# Patient Record
Sex: MALE | Race: WHITE | NOT HISPANIC OR LATINO | Employment: OTHER | URBAN - METROPOLITAN AREA
[De-identification: names, ages, dates, MRNs, and addresses within clinical notes are randomized per-mention and may not be internally consistent; named-entity substitution may affect disease eponyms.]

---

## 2017-02-15 ENCOUNTER — GENERIC CONVERSION - ENCOUNTER (OUTPATIENT)
Dept: OTHER | Facility: OTHER | Age: 82
End: 2017-02-15

## 2017-03-22 ENCOUNTER — ALLSCRIPTS OFFICE VISIT (OUTPATIENT)
Dept: OTHER | Facility: OTHER | Age: 82
End: 2017-03-22

## 2017-05-02 ENCOUNTER — GENERIC CONVERSION - ENCOUNTER (OUTPATIENT)
Dept: OTHER | Facility: OTHER | Age: 82
End: 2017-05-02

## 2017-05-10 ENCOUNTER — ALLSCRIPTS OFFICE VISIT (OUTPATIENT)
Dept: OTHER | Facility: OTHER | Age: 82
End: 2017-05-10

## 2017-06-28 ENCOUNTER — ALLSCRIPTS OFFICE VISIT (OUTPATIENT)
Dept: OTHER | Facility: OTHER | Age: 82
End: 2017-06-28

## 2017-07-11 ENCOUNTER — HOSPITAL ENCOUNTER (EMERGENCY)
Facility: HOSPITAL | Age: 82
Discharge: HOME | End: 2017-07-11
Attending: EMERGENCY MEDICINE | Admitting: EMERGENCY MEDICINE
Payer: MEDICARE

## 2017-07-11 ENCOUNTER — APPOINTMENT (EMERGENCY)
Dept: RADIOLOGY | Facility: HOSPITAL | Age: 82
End: 2017-07-11
Payer: MEDICARE

## 2017-07-11 ENCOUNTER — APPOINTMENT (EMERGENCY)
Dept: CT IMAGING | Facility: HOSPITAL | Age: 82
End: 2017-07-11
Payer: MEDICARE

## 2017-07-11 VITALS
OXYGEN SATURATION: 94 % | WEIGHT: 119 LBS | RESPIRATION RATE: 18 BRPM | DIASTOLIC BLOOD PRESSURE: 81 MMHG | HEART RATE: 67 BPM | TEMPERATURE: 97.9 F | SYSTOLIC BLOOD PRESSURE: 178 MMHG

## 2017-07-11 DIAGNOSIS — S22.42XA MULTIPLE FRACTURES OF RIBS, LEFT SIDE, INITIAL ENCOUNTER FOR CLOSED FRACTURE: ICD-10-CM

## 2017-07-11 DIAGNOSIS — T07.XXXA ABRASIONS OF MULTIPLE SITES: ICD-10-CM
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
## 2017-07-11 LAB
ANION GAP SERPL CALCULATED.3IONS-SCNC: 5 MMOL/L (ref 4–13)
BASOPHILS # BLD AUTO: 0.01 THOUSANDS/ΜL (ref 0–0.1)
BASOPHILS NFR BLD AUTO: 0 % (ref 0–1)
BUN SERPL-MCNC: 22 MG/DL (ref 5–25)
CALCIUM SERPL-MCNC: 9 MG/DL (ref 8.3–10.1)
CHLORIDE SERPL-SCNC: 103 MMOL/L (ref 100–108)
CO2 SERPL-SCNC: 34 MMOL/L (ref 21–32)
CREAT SERPL-MCNC: 1.01 MG/DL (ref 0.6–1.3)
EOSINOPHIL # BLD AUTO: 0.04 THOUSAND/ΜL (ref 0–0.61)
EOSINOPHIL NFR BLD AUTO: 1 % (ref 0–6)
ERYTHROCYTE [DISTWIDTH] IN BLOOD BY AUTOMATED COUNT: 15.5 % (ref 11.6–15.1)
GFR SERPL CREATININE-BSD FRML MDRD: >60 ML/MIN/1.73SQ M
GLUCOSE SERPL-MCNC: 147 MG/DL (ref 65–140)
HCT VFR BLD AUTO: 41.8 % (ref 36.5–49.3)
HGB BLD-MCNC: 13.1 G/DL (ref 12–17)
LYMPHOCYTES # BLD AUTO: 1.71 THOUSANDS/ΜL (ref 0.6–4.47)
LYMPHOCYTES NFR BLD AUTO: 24 % (ref 14–44)
MCH RBC QN AUTO: 29.6 PG (ref 26.8–34.3)
MCHC RBC AUTO-ENTMCNC: 31.3 G/DL (ref 31.4–37.4)
MCV RBC AUTO: 94 FL (ref 82–98)
MONOCYTES # BLD AUTO: 0.72 THOUSAND/ΜL (ref 0.17–1.22)
MONOCYTES NFR BLD AUTO: 10 % (ref 4–12)
NEUTROPHILS # BLD AUTO: 4.79 THOUSANDS/ΜL (ref 1.85–7.62)
NEUTS SEG NFR BLD AUTO: 65 % (ref 43–75)
NRBC BLD AUTO-RTO: 0 /100 WBCS
PLATELET # BLD AUTO: 207 THOUSANDS/UL (ref 149–390)
PMV BLD AUTO: 9.1 FL (ref 8.9–12.7)
POTASSIUM SERPL-SCNC: 3.7 MMOL/L (ref 3.5–5.3)
RBC # BLD AUTO: 4.43 MILLION/UL (ref 3.88–5.62)
SODIUM SERPL-SCNC: 142 MMOL/L (ref 136–145)
WBC # BLD AUTO: 7.27 THOUSAND/UL (ref 4.31–10.16)

## 2017-07-11 PROCEDURE — 90715 TDAP VACCINE 7 YRS/> IM: CPT | Performed by: EMERGENCY MEDICINE

## 2017-07-11 PROCEDURE — 96360 HYDRATION IV INFUSION INIT: CPT

## 2017-07-11 PROCEDURE — 80048 BASIC METABOLIC PNL TOTAL CA: CPT | Performed by: EMERGENCY MEDICINE

## 2017-07-11 PROCEDURE — 70450 CT HEAD/BRAIN W/O DYE: CPT

## 2017-07-11 PROCEDURE — 85025 COMPLETE CBC W/AUTO DIFF WBC: CPT | Performed by: EMERGENCY MEDICINE

## 2017-07-11 PROCEDURE — 71101 X-RAY EXAM UNILAT RIBS/CHEST: CPT

## 2017-07-11 PROCEDURE — 71250 CT THORAX DX C-: CPT

## 2017-07-11 PROCEDURE — 99284 EMERGENCY DEPT VISIT MOD MDM: CPT

## 2017-07-11 PROCEDURE — 36415 COLL VENOUS BLD VENIPUNCTURE: CPT | Performed by: EMERGENCY MEDICINE

## 2017-07-11 PROCEDURE — 90471 IMMUNIZATION ADMIN: CPT

## 2017-07-11 RX ORDER — LACTOSE-REDUCED FOOD
1 LIQUID (ML) ORAL 2 TIMES DAILY
COMMUNITY

## 2017-07-11 RX ORDER — BACITRACIN, NEOMYCIN, POLYMYXIN B 400; 3.5; 5 [USP'U]/G; MG/G; [USP'U]/G
1 OINTMENT TOPICAL ONCE
Status: COMPLETED | OUTPATIENT
Start: 2017-07-11 | End: 2017-07-11

## 2017-07-11 RX ORDER — LATANOPROST 50 UG/ML
1 SOLUTION/ DROPS OPHTHALMIC DAILY
COMMUNITY

## 2017-07-11 RX ORDER — NITROGLYCERIN 0.4 MG/1
0.4 TABLET SUBLINGUAL
COMMUNITY
End: 2018-08-02

## 2017-07-11 RX ORDER — LISINOPRIL 20 MG/1
20 TABLET ORAL 2 TIMES DAILY
COMMUNITY
End: 2018-08-10 | Stop reason: HOSPADM

## 2017-07-11 RX ORDER — GLIMEPIRIDE 2 MG/1
1 TABLET ORAL DAILY
COMMUNITY

## 2017-07-11 RX ORDER — OMEPRAZOLE 10 MG/1
20 CAPSULE, DELAYED RELEASE ORAL DAILY
COMMUNITY

## 2017-07-11 RX ORDER — SIMVASTATIN 80 MG
40 TABLET ORAL
COMMUNITY

## 2017-07-11 RX ORDER — LISINOPRIL 5 MG/1
10 TABLET ORAL ONCE
Status: COMPLETED | OUTPATIENT
Start: 2017-07-11 | End: 2017-07-11

## 2017-07-11 RX ORDER — ASPIRIN 325 MG
81 TABLET ORAL DAILY
COMMUNITY

## 2017-07-11 RX ORDER — FINASTERIDE 5 MG/1
5 TABLET, FILM COATED ORAL DAILY
COMMUNITY

## 2017-07-11 RX ADMIN — BACITRACIN ZINC, NEOMYCIN, POLYMYXIN B 1 LARGE APPLICATION: 400; 3.5; 5 OINTMENT TOPICAL at 16:15

## 2017-07-11 RX ADMIN — SODIUM CHLORIDE 500 ML: 0.9 INJECTION, SOLUTION INTRAVENOUS at 15:26

## 2017-07-11 RX ADMIN — LISINOPRIL 10 MG: 5 TABLET ORAL at 17:50

## 2017-07-11 RX ADMIN — TETANUS TOXOID, REDUCED DIPHTHERIA TOXOID AND ACELLULAR PERTUSSIS VACCINE, ADSORBED 0.5 ML: 5; 2.5; 8; 8; 2.5 SUSPENSION INTRAMUSCULAR at 16:13

## 2017-09-13 ENCOUNTER — ALLSCRIPTS OFFICE VISIT (OUTPATIENT)
Dept: OTHER | Facility: OTHER | Age: 82
End: 2017-09-13

## 2018-01-03 ENCOUNTER — GENERIC CONVERSION - ENCOUNTER (OUTPATIENT)
Dept: OTHER | Facility: OTHER | Age: 83
End: 2018-01-03

## 2018-01-10 NOTE — PROGRESS NOTES
Assessment  Assessed    1  Benign essential hypertension (401 1) (I10)   2  Arteriosclerosis of coronary artery (414 00) (I25 10)   3  Sinus bradycardia (427 89) (R00 1)   4  Hyperlipidemia (272 4) (E78 5)    Plan  Hyperlipidemia    · Follow-up visit in 6 months Evaluation and Treatment  Follow-up  Status: Hold For -  Scheduling  Requested for: 20Jan2016   Ordered; For: Hyperlipidemia; Ordered By: Lucia Burns Performed:  Due: [id number]    Discussion/Summary  Cardiology Discussion Summary Free Text Note Form [address]:   Cardiovascular status stable  Requested be checked on every 2 months which I do not feel his unreasonable in the setting of his known coronary disease bradycardia and hypertension we will set up that appointment  He will call if he develops any cardiovascular issues  Chief Complaint  Chief Complaint Chronic Condition [facility]: Patient is here today for follow up of chronic conditions described in HPI  History of Present Illness  Cardiology HPI Free Text Note Form [facility]: Continues to struggle emotionally with the loss of his son  Feels weak but gets around okay  Recently celebrated 80 birthday  Occasional orthostasis than his stable  Denies chest pain shortness of breath orthopnea paroxysmal nocturnal dyspnea or syncope      Review of Systems  Cardiology Male ROS:     Cardiac: no chest pain, no rhythm problems, no fainting/blackouts, no signs of swelling and no palpitations present  Skin: No complaints of nonhealing sores or skin rash  Genitourinary: frequent urination at night, but no recurrent urinary tract infections, no difficult urination, no blood in urine, no kidney stones, no loss of bladder control, no kidney problems, no prostate problems and no erectile dysfunction   Psychological: no depression and no difficulty concentrating  General: no trouble sleeping, no changes in weight, no lack of energy/fatigue, no night sweats and no frequent infections     Respiratory: No complaints of shortness of breath, cough with sputum, or wheezing  HEENT: No complaints of serious problems, hearing problems, nose problems, throat problems, or snoring  Gastrointestinal: diarrhea and abdonimal pain, but no nausea, no vomiting and no bloody stools stomach upset last night  Neurological: No complaints of numbness, tingling, dizziness, weakness, seizures, headaches, syncope or fainting, AM fatigue, daytime sleepiness, no witnessed apnea episodes  Musculoskeletal: No complaints of arthritis, back pain, or painfull swelling  ROS Reviewed:   ROS reviewed  Active Problems  Problems    1  Abdominal mass, LLQ (left lower quadrant) (789 34) (R19 04)   2  Angina pectoris (413 9) (I20 9)   3  Arteriosclerosis of coronary artery (414 00) (I25 10)   4  Benign essential hypertension (401 1) (I10)   5  Esophageal reflux (530 81) (K21 9)   6  Hyperlipidemia (272 4) (E78 5)   7  Malaise and fatigue (780 79) (R53 81,R53 83)   8  Sinus bradycardia (427 89) (R00 1)    Past Medical History  Problems    1  History of Acute myocardial infarction of inferior wall (410 40) (I21 19)   2  History of falling (V15 88) (Z91 81)   3  History of vertigo (V12 49) (Z87 898)   4  History of Staggering gait (781 2) (R26 0)  Active Problems And Past Medical History Reviewed: The active problems and past medical history were reviewed and updated today  Surgical History  Problems    1  History of Cholecystectomy   2  History of Hernia Repair  Surgical History Reviewed: The surgical history was reviewed and updated today  Family History  Family History Reviewed: The family history was reviewed and updated today  Social History  Problems    · Being A Social Drinker   · Never A Smoker  Social History Reviewed: The social history was reviewed and updated today  The social history was reviewed and is unchanged  Current Meds   1  Aspirin 325 MG Oral Tablet; Take 1 tablet daily Recorded   2   [employer] Iron TABS; Take 1 tablet daily as directed Recorded   3  Finasteride 5 MG Oral Tablet; TAKE 1 TABLET DAILY; Therapy: 12BLV3544 to Recorded   4  Latanoprost 0 005 % Ophthalmic Solution; USE AS DIRECTED Recorded   5  Lisinopril 20 MG Oral Tablet; One po BID; Therapy: 57QHU0702 to (Evaluate:21Jan2015)  Requested for: 44Fqu7767; Last   Rx:[id number]; Status: ACTIVE - Retrospective Authorization Ordered   6  Nitrostat 0 4 MG Sublingual Tablet Sublingual; DISSOLVE 1 TABLET UNDER THE   TONGUE AS NEEDED FOR CHEST PAIN Recorded   7  Protonix 40 MG Oral Tablet Delayed Release; Take 1 tablet daily Recorded   8  Senokot TABS; Take as directed Recorded   9  Simvastatin 80 MG Oral Tablet; TAKE 0 5 TABLET Daily at bedtime Recorded   10  Timolol Maleate 0 25 % Ophthalmic Solution; Therapy: 21Apr2011 to (Last Rx:21Apr2011)  Requested for: 21Apr2011 Ordered  Medication List Reviewed: The medication list was reviewed and updated today  Allergies  Medication    1  No Known Drug Allergies    Vitals  Vital Signs [Data Includes: Current Encounter]    Recorded: 01IUL8264 12:06PM   Heart Rate 64   Respiration 16   Systolic 714   Diastolic 60   Height 5 ft 5 in   Weight 118 lb    BMI Calculated 19 64   BSA Calculated 1 58     Physical Exam    Constitutional   General appearance: No acute distress, well appearing and well nourished  Eyes   Conjunctiva and Sclera examination: Conjunctiva pink, sclera anicteric  Ears, Nose, Mouth, and Throat - Oropharynx: Clear, nares are clear, mucous membranes are moist    Neck   Neck and thyroid: Normal, supple, trachea midline, no thyromegaly  Cardiovascular   Auscultation of heart: Normal rate and rhythm, normal S1 and S2, no murmurs  Pedal pulses: Abnormal   Posterior tibialis pulse was 0 on the right and 0 on the left  Abdomen   Abdomen: Non-tender and no distention  Musculoskeletal Gait and station: Normal gait     Skin - Skin and subcutaneous tissue: Normal without rashes or lesions  Skin is warm and well perfused, normal turgor      Neurologic - Speech: Normal     Psychiatric - Orientation to person, place, and time: Normal  Mood and affect: Normal       Signatures   Electronically signed by : LISA Pate ; Jan 20 2016 12:47PM EST                       (Author)

## 2018-01-10 NOTE — PROCEDURES
Procedures signed  by Aretha Ogden DO at 3/25/2016  9:46 AM       Author:  Aretha Ogden DO Service:  Cardiology Author Type:  Physician     Filed:  3/25/2016  9:46 AM Date of Service:  3/23/2016 11:22 AM Status:  Signed     :  Aretha Ogden DO (Physician)              Lloyd Ibrahim  [id number]  HOLTER MONITOR  03/17/2016    ORDERING PHYSICIAN  Dr Callie Millan    INDICATION  Angina and coronary artery disease  48 Hour Holter    Holter monitor duration 47 hours 59 minutes  Underlying rhythm is   normal sinus rhythm  Average heart rate 57 beats per minute ranging   from 50 to 97 beats per minute  There were frequent premature ventricular contractions consisting of   single beats  There were frequent premature atrial contractions with 8   runs of supraventricular tachycardia, the longest run consisting of 4   beats with the fastest at 133 beats per minute  There was no   significant bradycardia  There were no ST segment changes  The patient activity diary was included both day 1 and day 2 which was   returned without symptoms  No prior was available for comparison          [id number]  D:  03/23/2016 08:00:51  Dictated by:  Mikhail Goncalves DO  T:  03/23/2016 11:22:42    CC:  Sammie Mcneil MD           Received for:Herbie Rasmussen DO  Mar 25 2016  9:47AM [facility] Standard Time

## 2018-01-13 VITALS
BODY MASS INDEX: 19.68 KG/M2 | SYSTOLIC BLOOD PRESSURE: 102 MMHG | RESPIRATION RATE: 16 BRPM | DIASTOLIC BLOOD PRESSURE: 82 MMHG | HEART RATE: 52 BPM | WEIGHT: 118.13 LBS | HEIGHT: 65 IN

## 2018-01-13 VITALS
DIASTOLIC BLOOD PRESSURE: 64 MMHG | WEIGHT: 113 LBS | RESPIRATION RATE: 16 BRPM | BODY MASS INDEX: 18.83 KG/M2 | HEIGHT: 65 IN | HEART RATE: 60 BPM | SYSTOLIC BLOOD PRESSURE: 124 MMHG

## 2018-01-13 VITALS
HEIGHT: 65 IN | HEART RATE: 68 BPM | BODY MASS INDEX: 19.49 KG/M2 | WEIGHT: 117 LBS | SYSTOLIC BLOOD PRESSURE: 120 MMHG | DIASTOLIC BLOOD PRESSURE: 60 MMHG

## 2018-01-14 VITALS
DIASTOLIC BLOOD PRESSURE: 60 MMHG | SYSTOLIC BLOOD PRESSURE: 140 MMHG | HEIGHT: 65 IN | WEIGHT: 121 LBS | BODY MASS INDEX: 20.16 KG/M2 | HEART RATE: 64 BPM

## 2018-01-24 VITALS
RESPIRATION RATE: 16 BRPM | HEART RATE: 64 BPM | DIASTOLIC BLOOD PRESSURE: 70 MMHG | HEIGHT: 65 IN | WEIGHT: 118.5 LBS | BODY MASS INDEX: 19.74 KG/M2 | SYSTOLIC BLOOD PRESSURE: 122 MMHG

## 2018-04-18 ENCOUNTER — OFFICE VISIT (OUTPATIENT)
Dept: CARDIOLOGY CLINIC | Facility: CLINIC | Age: 83
End: 2018-04-18
Payer: MEDICARE

## 2018-04-18 VITALS
DIASTOLIC BLOOD PRESSURE: 82 MMHG | WEIGHT: 119 LBS | RESPIRATION RATE: 16 BRPM | HEIGHT: 65 IN | HEART RATE: 60 BPM | SYSTOLIC BLOOD PRESSURE: 128 MMHG | BODY MASS INDEX: 19.83 KG/M2

## 2018-04-18 DIAGNOSIS — I10 ESSENTIAL HYPERTENSION: ICD-10-CM

## 2018-04-18 DIAGNOSIS — I25.10 CORONARY ARTERY DISEASE INVOLVING NATIVE HEART WITHOUT ANGINA PECTORIS, UNSPECIFIED VESSEL OR LESION TYPE: Primary | ICD-10-CM

## 2018-04-18 DIAGNOSIS — E78.2 MIXED HYPERLIPIDEMIA: ICD-10-CM

## 2018-04-18 DIAGNOSIS — R00.1 SINUS BRADYCARDIA: ICD-10-CM

## 2018-04-18 PROCEDURE — 99214 OFFICE O/P EST MOD 30 MIN: CPT | Performed by: INTERNAL MEDICINE

## 2018-04-18 NOTE — PROGRESS NOTES
Cardiology Follow Up    Keily Sharon   1/15/1920  [id number]  [address]  [address] [address]    1  Coronary artery disease involving native heart without angina pectoris, unspecified vessel or lesion type     2  Mixed hyperlipidemia     3  Essential hypertension     4  Sinus bradycardia         Interval History:  He is now 80years old  Continues to have trouble with hearing  Appetite continues to be good  Denies chest pain shortness of breath orthopnea paroxysmal nocturnal dyspnea or syncope    There is no problem list on file for this patient  Past Medical History:   Diagnosis Date    Anemia     Cardiac disease     GERD (gastroesophageal reflux disease)     History of BPH      Social History     Social History    Marital status: Single     Spouse name: N/A    Number of children: N/A    Years of education: N/A     Occupational History    Not on file  Social History Main Topics    Smoking status: Never Smoker    Smokeless tobacco: Never Used    Alcohol use No    Drug use: No    Sexual activity: Not on file     Other Topics Concern    Not on file     Social History Narrative    No narrative on file      History reviewed  No pertinent family history  History reviewed  No pertinent surgical history      Current Outpatient Prescriptions:     aspirin 325 mg tablet, Take 325 mg by mouth daily, Disp: , Rfl:     finasteride (PROSCAR) 5 mg tablet, Take 5 mg by mouth daily, Disp: , Rfl:     IRON PO, Take by mouth, Disp: , Rfl:     latanoprost (XALATAN) 0 005 % ophthalmic solution, , Disp: , Rfl:     lisinopril (ZESTRIL) 20 mg tablet, Take 20 mg by mouth 2 (two) times a day, Disp: , Rfl:     nitroglycerin (NITROSTAT) 0 4 mg SL tablet, Place 0 4 mg under the tongue every 5 (five) minutes as needed for chest pain, Disp: , Rfl:     Nutritional Supplements (ENSURE PLUS) LIQD, Take by mouth, Disp: , Rfl:     omeprazole (PriLOSEC) 10 mg delayed release capsule, Take 20 mg by mouth daily, Disp: , Rfl:     Sennosides (SENOKOT PO), Take by mouth, Disp: , Rfl:     simvastatin (ZOCOR) 80 mg tablet, Take 40 mg by mouth daily at bedtime, Disp: , Rfl:     timolol (TIMOPTIC) 0 25 % ophthalmic solution, 1 drop, Disp: , Rfl:   No Known Allergies    Labs:  No visits with results within 6 Month(s) from this visit  Latest known visit with results is:   Admission on 07/11/2017, Discharged on 07/11/2017   Component Date Value    WBC 07/11/2017 7 27     RBC 07/11/2017 4 43     Hemoglobin 07/11/2017 13 1     Hematocrit 07/11/2017 41 8     MCV 07/11/2017 94     MCH 07/11/2017 29 6     MCHC 07/11/2017 31 3*    RDW 07/11/2017 15 5*    MPV 07/11/2017 9 1     Platelets 67/01/9759 207     nRBC 07/11/2017 0     Neutrophils Relative 07/11/2017 65     Lymphocytes Relative 07/11/2017 24     Monocytes Relative 07/11/2017 10     Eosinophils Relative 07/11/2017 1     Basophils Relative 07/11/2017 0     Neutrophils Absolute 07/11/2017 4 79     Lymphocytes Absolute 07/11/2017 1 71     Monocytes Absolute 07/11/2017 0 72     Eosinophils Absolute 07/11/2017 0 04     Basophils Absolute 07/11/2017 0 01     Sodium 07/11/2017 142     Potassium 07/11/2017 3 7     Chloride 07/11/2017 103     CO2 07/11/2017 34*    Anion Gap 07/11/2017 5     BUN 07/11/2017 22     Creatinine 07/11/2017 1 01     Glucose 07/11/2017 147*    Calcium 07/11/2017 9 0     eGFR 07/11/2017 >60 0      Imaging: No results found  Review of Systems:  Review of Systems   Constitutional: Positive for fatigue  HENT: Positive for hearing loss  Negative for nosebleeds  Eyes: Negative for redness  Respiratory: Negative for chest tightness and shortness of breath  Cardiovascular: Negative for chest pain, palpitations and leg swelling  Gastrointestinal: Negative for abdominal pain  Endocrine: Negative for polyuria     Genitourinary: Negative for urgency  Musculoskeletal: Positive for arthralgias  Skin: Negative for rash  Neurological: Negative for dizziness and syncope  Psychiatric/Behavioral: Negative for confusion and sleep disturbance  The patient is not nervous/anxious  Physical Exam:  Physical Exam   Constitutional: He is oriented to person, place, and time  He appears well-developed and well-nourished  HENT:   Head: Normocephalic and atraumatic  Nose: Nose normal    Mouth/Throat: Oropharynx is clear and moist    Eyes: Pupils are equal, round, and reactive to light  Neck: Neck supple  Cardiovascular: Normal rate, regular rhythm and normal heart sounds  Pulmonary/Chest: Effort normal and breath sounds normal    Abdominal: Soft  Bowel sounds are normal    Musculoskeletal: Normal range of motion  Neurological: He is alert and oriented to person, place, and time  Skin: Skin is warm and dry  Psychiatric: He has a normal mood and affect  His behavior is normal  Judgment and thought content normal        Discussion/Summary:  Of overall doing well  Blood pressure is controlled  He remains asymptomatic with mild sinus bradycardia  Has no symptoms of angina    I will see him again in 3 months

## 2018-06-20 ENCOUNTER — TELEPHONE (OUTPATIENT)
Dept: CARDIOLOGY CLINIC | Facility: CLINIC | Age: 83
End: 2018-06-20

## 2018-06-20 NOTE — TELEPHONE ENCOUNTER
Phone call from Kam gomez at the [facility]  Question if there are any precautiions needed for Nghia Chairez during dental cleaning or any dental procedure  He has appt sched for July    Please advise

## 2018-07-11 ENCOUNTER — OFFICE VISIT (OUTPATIENT)
Dept: CARDIOLOGY CLINIC | Facility: CLINIC | Age: 83
End: 2018-07-11
Payer: MEDICARE

## 2018-07-11 VITALS
BODY MASS INDEX: 19.97 KG/M2 | SYSTOLIC BLOOD PRESSURE: 134 MMHG | HEART RATE: 84 BPM | DIASTOLIC BLOOD PRESSURE: 70 MMHG | WEIGHT: 120 LBS

## 2018-07-11 DIAGNOSIS — I10 ESSENTIAL HYPERTENSION: ICD-10-CM

## 2018-07-11 DIAGNOSIS — R00.1 SINUS BRADYCARDIA: ICD-10-CM

## 2018-07-11 DIAGNOSIS — I25.10 CORONARY ARTERY DISEASE INVOLVING NATIVE HEART WITHOUT ANGINA PECTORIS, UNSPECIFIED VESSEL OR LESION TYPE: Primary | ICD-10-CM

## 2018-07-11 DIAGNOSIS — E78.00 PURE HYPERCHOLESTEROLEMIA: ICD-10-CM

## 2018-07-11 PROCEDURE — 99214 OFFICE O/P EST MOD 30 MIN: CPT | Performed by: INTERNAL MEDICINE

## 2018-07-11 NOTE — PROGRESS NOTES
Cardiology Follow Up    Annabelle Hirsch   1/15/1920  [id number]  [address] [place] [address], [address] [place] [address]    Impressions:  CAD  Hypertension  Hyperchol      Interval History:  He is now 80years old  Continues to have trouble with hearing  Appetite continues to be good  Denies chest pain shortness of breath orthopnea paroxysmal nocturnal dyspnea or syncope    Patient Active Problem List   Diagnosis    Sinus bradycardia    Essential hypertension    Pure hypercholesterolemia    Coronary artery disease involving native heart without angina pectoris     Past Medical History:   Diagnosis Date    Anemia     Cardiac disease     GERD (gastroesophageal reflux disease)     History of BPH      Social History     Social History    Marital status: Single     Spouse name: N/A    Number of children: N/A    Years of education: N/A     Occupational History    Not on file  Social History Main Topics    Smoking status: Never Smoker    Smokeless tobacco: Never Used    Alcohol use No    Drug use: No    Sexual activity: Not on file     Other Topics Concern    Not on file     Social History Narrative    No narrative on file      No family history on file  No past surgical history on file      Current Outpatient Prescriptions:     aspirin 325 mg tablet, Take 325 mg by mouth daily, Disp: , Rfl:     finasteride (PROSCAR) 5 mg tablet, Take 5 mg by mouth daily, Disp: , Rfl:     IRON PO, Take by mouth, Disp: , Rfl:     latanoprost (XALATAN) 0 005 % ophthalmic solution, , Disp: , Rfl:     lisinopril (ZESTRIL) 20 mg tablet, Take 20 mg by mouth 2 (two) times a day, Disp: , Rfl:     nitroglycerin (NITROSTAT) 0 4 mg SL tablet, Place 0 4 mg under the tongue every 5 (five) minutes as needed for chest pain, Disp: , Rfl:     Nutritional Supplements (ENSURE PLUS) LIQD, Take by mouth, Disp: , Rfl:     omeprazole (PriLOSEC) 10 mg delayed release capsule, Take 20 mg by mouth daily, Disp: , Rfl:     Sennosides (SENOKOT PO), Take by mouth, Disp: , Rfl:     simvastatin (ZOCOR) 80 mg tablet, Take 40 mg by mouth daily at bedtime, Disp: , Rfl:     timolol (TIMOPTIC) 0 25 % ophthalmic solution, 1 drop, Disp: , Rfl:   No Known Allergies    Labs:  No visits with results within 6 Month(s) from this visit  Latest known visit with results is:   Admission on 07/11/2017, Discharged on 07/11/2017   Component Date Value    WBC 07/11/2017 7 27     RBC 07/11/2017 4 43     Hemoglobin 07/11/2017 13 1     Hematocrit 07/11/2017 41 8     MCV 07/11/2017 94     MCH 07/11/2017 29 6     MCHC 07/11/2017 31 3*    RDW 07/11/2017 15 5*    MPV 07/11/2017 9 1     Platelets 40/40/7193 207     nRBC 07/11/2017 0     Neutrophils Relative 07/11/2017 65     Lymphocytes Relative 07/11/2017 24     Monocytes Relative 07/11/2017 10     Eosinophils Relative 07/11/2017 1     Basophils Relative 07/11/2017 0     Neutrophils Absolute 07/11/2017 4 79     Lymphocytes Absolute 07/11/2017 1 71     Monocytes Absolute 07/11/2017 0 72     Eosinophils Absolute 07/11/2017 0 04     Basophils Absolute 07/11/2017 0 01     Sodium 07/11/2017 142     Potassium 07/11/2017 3 7     Chloride 07/11/2017 103     CO2 07/11/2017 34*    Anion Gap 07/11/2017 5     BUN 07/11/2017 22     Creatinine 07/11/2017 1 01     Glucose 07/11/2017 147*    Calcium 07/11/2017 9 0     eGFR 07/11/2017 >60 0      Imaging: No results found  Review of Systems:  Review of Systems   Constitutional: Positive for fatigue  HENT: Positive for hearing loss  Negative for nosebleeds  Eyes: Negative for redness  Respiratory: Negative for chest tightness and shortness of breath  Cardiovascular: Negative for chest pain, palpitations and leg swelling  Gastrointestinal: Negative for abdominal pain  Endocrine: Negative for polyuria  Genitourinary: Negative for urgency     Musculoskeletal: Positive for arthralgias  Skin: Negative for rash  Neurological: Negative for dizziness and syncope  Psychiatric/Behavioral: Negative for confusion and sleep disturbance  The patient is not nervous/anxious  Physical Exam:  Physical Exam   Constitutional: He is oriented to person, place, and time  He appears well-developed and well-nourished  HENT:   Head: Normocephalic and atraumatic  Nose: Nose normal    Mouth/Throat: Oropharynx is clear and moist    Eyes: Pupils are equal, round, and reactive to light  Neck: Neck supple  Cardiovascular: Normal rate, regular rhythm and normal heart sounds  Pulmonary/Chest: Effort normal and breath sounds normal    Abdominal: Soft  Bowel sounds are normal    Musculoskeletal: Normal range of motion  Neurological: He is alert and oriented to person, place, and time  Skin: Skin is warm and dry  Psychiatric: He has a normal mood and affect  His behavior is normal  Judgment and thought content normal        Discussion/Summary:  Of overall doing well  Blood pressure is controlled  He remains asymptomatic with mild sinus bradycardia  Has no symptoms of angina  He is on a statin and he will forward me his blood work from the [address] I will see him again in 3 months

## 2018-08-02 ENCOUNTER — HOSPITAL ENCOUNTER (INPATIENT)
Facility: HOSPITAL | Age: 83
LOS: 8 days | Discharge: NURSING, REHAB OR LONG-TERM CARE | DRG: 280 | End: 2018-08-10
Attending: EMERGENCY MEDICINE | Admitting: INTERNAL MEDICINE
Payer: OTHER GOVERNMENT

## 2018-08-02 ENCOUNTER — APPOINTMENT (EMERGENCY)
Dept: RADIOLOGY | Facility: HOSPITAL | Age: 83
DRG: 280 | End: 2018-08-02
Payer: OTHER GOVERNMENT

## 2018-08-02 DIAGNOSIS — I21.4 NON-ST ELEVATION (NSTEMI) MYOCARDIAL INFARCTION (HCC): ICD-10-CM

## 2018-08-02 DIAGNOSIS — R06.89 HYPERCAPNIA: ICD-10-CM

## 2018-08-02 DIAGNOSIS — I50.9 ACUTE HEART FAILURE (HCC): ICD-10-CM

## 2018-08-02 DIAGNOSIS — J96.02 ACUTE RESPIRATORY FAILURE WITH HYPOXIA AND HYPERCARBIA (HCC): ICD-10-CM

## 2018-08-02 DIAGNOSIS — J96.01 ACUTE RESPIRATORY FAILURE WITH HYPOXIA AND HYPERCARBIA (HCC): ICD-10-CM

## 2018-08-02 DIAGNOSIS — I25.10 CORONARY ARTERY DISEASE INVOLVING NATIVE HEART WITHOUT ANGINA PECTORIS, UNSPECIFIED VESSEL OR LESION TYPE: ICD-10-CM

## 2018-08-02 DIAGNOSIS — I50.41 ACUTE COMBINED SYSTOLIC AND DIASTOLIC CONGESTIVE HEART FAILURE (HCC): Primary | ICD-10-CM

## 2018-08-02 DIAGNOSIS — R09.02 HYPOXIA: ICD-10-CM

## 2018-08-02 LAB
ALBUMIN SERPL BCP-MCNC: 3 G/DL (ref 3.5–5)
ALP SERPL-CCNC: 77 U/L (ref 46–116)
ALT SERPL W P-5'-P-CCNC: 41 U/L (ref 12–78)
ANION GAP SERPL CALCULATED.3IONS-SCNC: 3 MMOL/L (ref 4–13)
APTT PPP: 34 SECONDS (ref 24–36)
ARTERIAL PATENCY WRIST A: YES
AST SERPL W P-5'-P-CCNC: 38 U/L (ref 5–45)
BACTERIA UR QL AUTO: NORMAL /HPF
BASE EXCESS BLDA CALC-SCNC: 4 MMOL/L
BASOPHILS # BLD AUTO: 0.01 THOUSANDS/ΜL (ref 0–0.1)
BASOPHILS NFR BLD AUTO: 0 % (ref 0–1)
BILIRUB SERPL-MCNC: 0.51 MG/DL (ref 0.2–1)
BILIRUB UR QL STRIP: ABNORMAL
BUN SERPL-MCNC: 29 MG/DL (ref 5–25)
CALCIUM SERPL-MCNC: 8.8 MG/DL (ref 8.3–10.1)
CHLORIDE SERPL-SCNC: 102 MMOL/L (ref 100–108)
CLARITY UR: ABNORMAL
CO2 SERPL-SCNC: 37 MMOL/L (ref 21–32)
COLOR UR: YELLOW
CREAT SERPL-MCNC: 1.24 MG/DL (ref 0.6–1.3)
DEPRECATED D DIMER PPP: 717 NG/ML (FEU) (ref 0–424)
EOSINOPHIL # BLD AUTO: 0.05 THOUSAND/ΜL (ref 0–0.61)
EOSINOPHIL NFR BLD AUTO: 1 % (ref 0–6)
ERYTHROCYTE [DISTWIDTH] IN BLOOD BY AUTOMATED COUNT: 16 % (ref 11.6–15.1)
GFR SERPL CREATININE-BSD FRML MDRD: 48 ML/MIN/1.73SQ M
GLUCOSE SERPL-MCNC: 114 MG/DL (ref 65–140)
GLUCOSE UR STRIP-MCNC: NEGATIVE MG/DL
HCO3 BLDA-SCNC: 32.7 MMOL/L (ref 22–28)
HCT VFR BLD AUTO: 48.7 % (ref 36.5–49.3)
HGB BLD-MCNC: 14.4 G/DL (ref 12–17)
HGB UR QL STRIP.AUTO: NEGATIVE
INR PPP: 1.04 (ref 0.86–1.17)
IPAP: 12
KETONES UR STRIP-MCNC: NEGATIVE MG/DL
LACTATE SERPL-SCNC: 1.8 MMOL/L (ref 0.5–2)
LEUKOCYTE ESTERASE UR QL STRIP: NEGATIVE
LYMPHOCYTES # BLD AUTO: 1.2 THOUSANDS/ΜL (ref 0.6–4.47)
LYMPHOCYTES NFR BLD AUTO: 15 % (ref 14–44)
MCH RBC QN AUTO: 29.1 PG (ref 26.8–34.3)
MCHC RBC AUTO-ENTMCNC: 29.6 G/DL (ref 31.4–37.4)
MCV RBC AUTO: 99 FL (ref 82–98)
MONOCYTES # BLD AUTO: 0.77 THOUSAND/ΜL (ref 0.17–1.22)
MONOCYTES NFR BLD AUTO: 10 % (ref 4–12)
NEUTROPHILS # BLD AUTO: 6.09 THOUSANDS/ΜL (ref 1.85–7.62)
NEUTS SEG NFR BLD AUTO: 75 % (ref 43–75)
NITRITE UR QL STRIP: NEGATIVE
NON VENT- BIPAP: ABNORMAL
NON-SQ EPI CELLS URNS QL MICRO: NORMAL /HPF
NT-PROBNP SERPL-MCNC: ABNORMAL PG/ML
O2 CT BLDA-SCNC: 19.1 ML/DL (ref 16–23)
OXYHGB MFR BLDA: 95.4 % (ref 94–97)
PCO2 BLDA: 68.5 MM HG (ref 36–44)
PEEP MAX SETTING VENT: 5 CM[H2O]
PH BLDA: 7.3 [PH] (ref 7.35–7.45)
PH UR STRIP.AUTO: 5 [PH] (ref 4.5–8)
PLATELET # BLD AUTO: 195 THOUSANDS/UL (ref 149–390)
PLATELET # BLD AUTO: 243 THOUSANDS/UL (ref 149–390)
PMV BLD AUTO: 10.4 FL (ref 8.9–12.7)
PMV BLD AUTO: 9.1 FL (ref 8.9–12.7)
PO2 BLDA: 94.5 MM HG (ref 75–129)
POTASSIUM SERPL-SCNC: 5 MMOL/L (ref 3.5–5.3)
PROT SERPL-MCNC: 6.7 G/DL (ref 6.4–8.2)
PROT UR STRIP-MCNC: ABNORMAL MG/DL
PROTHROMBIN TIME: 13.7 SECONDS (ref 11.8–14.2)
RBC # BLD AUTO: 4.94 MILLION/UL (ref 3.88–5.62)
RBC #/AREA URNS AUTO: NORMAL /HPF
SODIUM SERPL-SCNC: 142 MMOL/L (ref 136–145)
SP GR UR STRIP.AUTO: 1.02 (ref 1–1.03)
SPECIMEN SOURCE: ABNORMAL
TROPONIN I SERPL-MCNC: 0.17 NG/ML
TROPONIN I SERPL-MCNC: 0.17 NG/ML
TROPONIN I SERPL-MCNC: 0.18 NG/ML
TROPONIN I SERPL-MCNC: 0.24 NG/ML
UROBILINOGEN UR QL STRIP.AUTO: 1 E.U./DL
VENT BIPAP FIO2: 70 %
WBC # BLD AUTO: 8.12 THOUSAND/UL (ref 4.31–10.16)
WBC #/AREA URNS AUTO: NORMAL /HPF

## 2018-08-02 PROCEDURE — 87040 BLOOD CULTURE FOR BACTERIA: CPT | Performed by: PHYSICIAN ASSISTANT

## 2018-08-02 PROCEDURE — 85025 COMPLETE CBC W/AUTO DIFF WBC: CPT | Performed by: PHYSICIAN ASSISTANT

## 2018-08-02 PROCEDURE — 99255 IP/OBS CONSLTJ NEW/EST HI 80: CPT | Performed by: INTERNAL MEDICINE

## 2018-08-02 PROCEDURE — 94660 CPAP INITIATION&MGMT: CPT

## 2018-08-02 PROCEDURE — 99222 1ST HOSP IP/OBS MODERATE 55: CPT | Performed by: INTERNAL MEDICINE

## 2018-08-02 PROCEDURE — 83605 ASSAY OF LACTIC ACID: CPT | Performed by: PHYSICIAN ASSISTANT

## 2018-08-02 PROCEDURE — 85049 AUTOMATED PLATELET COUNT: CPT | Performed by: INTERNAL MEDICINE

## 2018-08-02 PROCEDURE — 81001 URINALYSIS AUTO W/SCOPE: CPT

## 2018-08-02 PROCEDURE — 96374 THER/PROPH/DIAG INJ IV PUSH: CPT

## 2018-08-02 PROCEDURE — 36600 WITHDRAWAL OF ARTERIAL BLOOD: CPT

## 2018-08-02 PROCEDURE — 83880 ASSAY OF NATRIURETIC PEPTIDE: CPT | Performed by: PHYSICIAN ASSISTANT

## 2018-08-02 PROCEDURE — 84484 ASSAY OF TROPONIN QUANT: CPT | Performed by: PHYSICIAN ASSISTANT

## 2018-08-02 PROCEDURE — 80053 COMPREHEN METABOLIC PANEL: CPT | Performed by: PHYSICIAN ASSISTANT

## 2018-08-02 PROCEDURE — 71045 X-RAY EXAM CHEST 1 VIEW: CPT

## 2018-08-02 PROCEDURE — 85610 PROTHROMBIN TIME: CPT | Performed by: PHYSICIAN ASSISTANT

## 2018-08-02 PROCEDURE — 93005 ELECTROCARDIOGRAM TRACING: CPT

## 2018-08-02 PROCEDURE — 85730 THROMBOPLASTIN TIME PARTIAL: CPT | Performed by: PHYSICIAN ASSISTANT

## 2018-08-02 PROCEDURE — 84484 ASSAY OF TROPONIN QUANT: CPT | Performed by: INTERNAL MEDICINE

## 2018-08-02 PROCEDURE — 36415 COLL VENOUS BLD VENIPUNCTURE: CPT | Performed by: PHYSICIAN ASSISTANT

## 2018-08-02 PROCEDURE — 85379 FIBRIN DEGRADATION QUANT: CPT | Performed by: PHYSICIAN ASSISTANT

## 2018-08-02 PROCEDURE — 82805 BLOOD GASES W/O2 SATURATION: CPT | Performed by: PHYSICIAN ASSISTANT

## 2018-08-02 PROCEDURE — 99285 EMERGENCY DEPT VISIT HI MDM: CPT

## 2018-08-02 RX ORDER — ATORVASTATIN CALCIUM 40 MG/1
40 TABLET, FILM COATED ORAL
Status: DISCONTINUED | OUTPATIENT
Start: 2018-08-02 | End: 2018-08-10 | Stop reason: HOSPADM

## 2018-08-02 RX ORDER — LATANOPROST 50 UG/ML
1 SOLUTION/ DROPS OPHTHALMIC DAILY
Status: DISCONTINUED | OUTPATIENT
Start: 2018-08-02 | End: 2018-08-10 | Stop reason: HOSPADM

## 2018-08-02 RX ORDER — FLUTICASONE PROPIONATE 50 MCG
1 SPRAY, SUSPENSION (ML) NASAL 2 TIMES DAILY
COMMUNITY

## 2018-08-02 RX ORDER — ASPIRIN 81 MG/1
243 TABLET, CHEWABLE ORAL ONCE
Status: COMPLETED | OUTPATIENT
Start: 2018-08-02 | End: 2018-08-02

## 2018-08-02 RX ORDER — FUROSEMIDE 10 MG/ML
40 INJECTION INTRAMUSCULAR; INTRAVENOUS ONCE
Status: COMPLETED | OUTPATIENT
Start: 2018-08-02 | End: 2018-08-02

## 2018-08-02 RX ORDER — CETIRIZINE HYDROCHLORIDE 5 MG/1
5 TABLET ORAL DAILY PRN
COMMUNITY
End: 2018-08-10 | Stop reason: HOSPADM

## 2018-08-02 RX ORDER — PANTOPRAZOLE SODIUM 20 MG/1
20 TABLET, DELAYED RELEASE ORAL
Status: DISCONTINUED | OUTPATIENT
Start: 2018-08-03 | End: 2018-08-10 | Stop reason: HOSPADM

## 2018-08-02 RX ORDER — LISINOPRIL 20 MG/1
20 TABLET ORAL DAILY
Status: DISCONTINUED | OUTPATIENT
Start: 2018-08-02 | End: 2018-08-03

## 2018-08-02 RX ORDER — ACETAMINOPHEN 325 MG/1
650 TABLET ORAL EVERY 6 HOURS PRN
COMMUNITY

## 2018-08-02 RX ORDER — CHLORHEXIDINE GLUCONATE 0.12 MG/ML
15 RINSE ORAL EVERY 12 HOURS SCHEDULED
Status: DISCONTINUED | OUTPATIENT
Start: 2018-08-02 | End: 2018-08-10 | Stop reason: HOSPADM

## 2018-08-02 RX ORDER — DOCUSATE SODIUM 100 MG/1
100 CAPSULE, LIQUID FILLED ORAL DAILY PRN
Status: DISCONTINUED | OUTPATIENT
Start: 2018-08-02 | End: 2018-08-10 | Stop reason: HOSPADM

## 2018-08-02 RX ORDER — FERROUS SULFATE 325(65) MG
325 TABLET ORAL 2 TIMES DAILY
Status: DISCONTINUED | OUTPATIENT
Start: 2018-08-02 | End: 2018-08-10 | Stop reason: HOSPADM

## 2018-08-02 RX ORDER — FINASTERIDE 5 MG/1
5 TABLET, FILM COATED ORAL DAILY
Status: DISCONTINUED | OUTPATIENT
Start: 2018-08-02 | End: 2018-08-10 | Stop reason: HOSPADM

## 2018-08-02 RX ORDER — GLIMEPIRIDE 2 MG/1
1 TABLET ORAL DAILY
Status: DISCONTINUED | OUTPATIENT
Start: 2018-08-02 | End: 2018-08-10 | Stop reason: HOSPADM

## 2018-08-02 RX ORDER — HEPARIN SODIUM 5000 [USP'U]/ML
5000 INJECTION, SOLUTION INTRAVENOUS; SUBCUTANEOUS EVERY 8 HOURS SCHEDULED
Status: DISCONTINUED | OUTPATIENT
Start: 2018-08-02 | End: 2018-08-10 | Stop reason: HOSPADM

## 2018-08-02 RX ORDER — ACETAMINOPHEN 325 MG/1
650 TABLET ORAL EVERY 6 HOURS PRN
Status: DISCONTINUED | OUTPATIENT
Start: 2018-08-02 | End: 2018-08-10 | Stop reason: HOSPADM

## 2018-08-02 RX ORDER — FLUTICASONE PROPIONATE 50 MCG
1 SPRAY, SUSPENSION (ML) NASAL 2 TIMES DAILY
Status: DISCONTINUED | OUTPATIENT
Start: 2018-08-02 | End: 2018-08-10 | Stop reason: HOSPADM

## 2018-08-02 RX ORDER — ASPIRIN 325 MG
325 TABLET ORAL DAILY
Status: DISCONTINUED | OUTPATIENT
Start: 2018-08-02 | End: 2018-08-03

## 2018-08-02 RX ORDER — FUROSEMIDE 10 MG/ML
40 INJECTION INTRAMUSCULAR; INTRAVENOUS DAILY
Status: DISCONTINUED | OUTPATIENT
Start: 2018-08-02 | End: 2018-08-09

## 2018-08-02 RX ORDER — DOCUSATE SODIUM 100 MG/1
100 CAPSULE, LIQUID FILLED ORAL DAILY PRN
COMMUNITY

## 2018-08-02 RX ORDER — LACTOSE-REDUCED FOOD
1 LIQUID (ML) ORAL 2 TIMES DAILY
Status: DISCONTINUED | OUTPATIENT
Start: 2018-08-02 | End: 2018-08-06

## 2018-08-02 RX ADMIN — TIMOLOL MALEATE 1 DROP: 2.5 SOLUTION OPHTHALMIC at 17:50

## 2018-08-02 RX ADMIN — HEPARIN SODIUM 5000 UNITS: 5000 INJECTION, SOLUTION INTRAVENOUS; SUBCUTANEOUS at 15:11

## 2018-08-02 RX ADMIN — ATORVASTATIN CALCIUM 40 MG: 40 TABLET, FILM COATED ORAL at 17:50

## 2018-08-02 RX ADMIN — Medication 325 MG: at 15:10

## 2018-08-02 RX ADMIN — FUROSEMIDE 40 MG: 10 INJECTION, SOLUTION INTRAMUSCULAR; INTRAVENOUS at 12:05

## 2018-08-02 RX ADMIN — LATANOPROST 1 DROP: 50 SOLUTION OPHTHALMIC at 17:50

## 2018-08-02 RX ADMIN — HEPARIN SODIUM 5000 UNITS: 5000 INJECTION, SOLUTION INTRAVENOUS; SUBCUTANEOUS at 21:59

## 2018-08-02 RX ADMIN — CHLORHEXIDINE GLUCONATE 15 ML: 1.2 RINSE ORAL at 15:10

## 2018-08-02 RX ADMIN — ASPIRIN 81 MG 243 MG: 81 TABLET ORAL at 11:57

## 2018-08-02 NOTE — ED ATTENDING ATTESTATION
Manisha Junior DO, saw and evaluated the patient  I have discussed the patient with the resident/non-physician practitioner and agree with the resident's/non-physician practitioner's findings, Plan of Care, and MDM as documented in the resident's/non-physician practitioner's note, except where noted  All available labs and Radiology studies were reviewed  At this point I agree with the current assessment done in the Emergency Department  I have conducted an independent evaluation of this patient a history and physical is as follows:    27-year-old male presents for evaluation of several days of progressive weakness  The patient denies any associated chest pain, pressure, fever, chills, cough, sputum production  His symptoms are worse with exertion  A 10 point review of systems was limited by the patient's respiratory distress  On examination:  The patient is awake, alert and oriented  HEENT: Normocephalic/atraumatic  External examination of the ears is unremarkable  Pupils are equal round and reactive to light, there is no conjunctival injection or scleral icterus noted  Nares are patent without rhinorrhea  The oropharynx is moist without injection  The neck is supple  Lungs:  Diminished bilaterally  Heart: Regular without murmurs rubs or gallops  Abdomen: Soft and nontender  There are positive bowel sounds  there is no rebound or guarding  Musculoskeletal: Normal range of motion with grossly normal strength  Neuro: Cranial nerves II through XII grossly intact  Nonfocal exam  Skin: No rash noted, there is a healing wound to the left tibial region    There is pitting edema bilaterally  Psych: Mood and affect normal          Critical Care Time  CritCare Time  33 minutes for acute respiratory failure with hypoxia    Procedures

## 2018-08-02 NOTE — CASE MANAGEMENT
Initial Clinical Review    Admission: Date/Time/Statement: 8/2/18 @ 1217 Inpatient Written     Orders Placed This Encounter   Procedures    Inpatient Admission (expected length of stay for this patient is greater than two midnights)     Standing Status:   Standing     Number of Occurrences:   1     Order Specific Question:   Admitting Physician     Answer:   Courtney Stinson     Order Specific Question:   Level of Care     Answer:   Level 1 Stepdown [13]     Order Specific Question:   Estimated length of stay     Answer:   More than 2 Midnights     Order Specific Question:   Certification     Answer:   I certify that inpatient services are medically necessary for this patient for a duration of greater than two midnights  See H&P and MD Progress Notes for additional information about the patient's course of treatment  ED: Date/Time/Mode of Arrival:   ED Arrival Information     Expected Arrival Acuity Means of Arrival Escorted By Service Admission Type    - 8/2/2018 10:31 Emergent Ambulance [facility] General Medicine Emergency    Arrival Complaint    weakness           Chief Complaint:   Chief Complaint   Patient presents with    Extremity Weakness     worsening b/l leg weakness for the past couple of days  History of Illness: Patient presents emergency department with weakness  He was going to go to the family doctor's today and when his neighbor went to pick him up they could not get him in the car because he was so weak and so they called 911  Patient reports that his legs just feel very weak and he feels very weak the past couple of days in a keeps getting worse  Patient lives at home alone  Patient denies any other symptoms he is not having any headaches or dizziness  He denies any chest pain or difficulty breathing or any coughing or fevers  Patient was transported via EMS    Room air oxygen was initially in the 60s with high-flow to we got him into the 80s and then ultimately to 90 BiPAP was then started on the patient providing better oxygenation       ED Vital Signs:   ED Triage Vitals   Temperature Pulse Respirations Blood Pressure SpO2   08/02/18 1035 08/02/18 1035 08/02/18 1035 08/02/18 1035 08/02/18 1035   98 3 °F (36 8 °C) 64 20 153/68 (!) 66 %      Temp Source Heart Rate Source Patient Position - Orthostatic VS BP Location FiO2 (%)   08/02/18 1035 08/02/18 1035 08/02/18 1035 08/02/18 1035 08/02/18 1100   Oral Monitor Lying Right arm 70      Pain Score       08/02/18 1035       No Pain        Wt Readings from Last 1 Encounters:   08/02/18 65 9 kg (145 lb 4 5 oz)       Vital Signs (abnormal):   Date/Time  Temp  Pulse  Resp  BP  SpO2  O2 Device   08/02/18 1500  99 7 °F (37 6 °C)  --  --  --  --  --   08/02/18 1300  --   50   24  135/60  96 %  --   08/02/18 1245  --   54   24  135/60  95 %  --   08/02/18 1230  --   54   23  170/72  --  --   08/02/18 1200  --   52  20  137/63  98 %  --   08/02/18 1145  --   54  22  153/65  97 %  --   08/02/18 1119  --  59   24  123/88  96 %  Other (comment)   08/02/18 1100  --  60   26  141/71  96 %  Other (comment)   08/02/18 1058  --  --  --  --  96 %  --   08/02/18 1035  98 3 °F (36 8 °C)  64  20  153/68   66 %  None (Room air)     Abnormal Labs/Diagnostic Test Results:   CO2 37*   BUN 29*     Troponin I 0 17       NT-proBNP 19,286      D-Dimer, Quant 717        8/2/18 1116   pH, Arterial 7 297     pCO2, Arterial 68 5     HCO3, Arterial 32 7       Protein, UA 30 (1+)     Bilirubin, UA Interference- unable to analyze       CXR: Bibasilar atelectasis  EKG:  First-degree AV block in presence of sinus rhythm with inverted T-waves in leads 3 subtle lead AVF and inverted V1 through V4 with QS in V1 through V3    ED Treatment:   Medication Administration from 08/02/2018 1031 to 08/02/2018 1327       Date/Time Order Dose Route Action     08/02/2018 1157 aspirin chewable tablet 243 mg 243 mg Oral Given     08/02/2018 1205 furosemide (LASIX) injection 40 mg 40 mg Intravenous Given          Past Medical/Surgical History: Active Ambulatory Problems     Diagnosis Date Noted    Sinus bradycardia 07/11/2018    Essential hypertension 07/11/2018    Pure hypercholesterolemia 07/11/2018    Coronary artery disease involving native heart without angina pectoris 07/11/2018     Resolved Ambulatory Problems     Diagnosis Date Noted    No Resolved Ambulatory Problems     Past Medical History:   Diagnosis Date    Anemia     Cardiac disease     GERD (gastroesophageal reflux disease)     History of BPH        Admitting Diagnosis: Acute heart failure (HCC) [I50 9]  Weakness [R53 1]  Hypoxia [R09 02]  Acute combined systolic and diastolic congestive heart failure (HCC) [I50 41]  Coronary artery disease involving native heart without angina pectoris, unspecified vessel or lesion type [I25 10]    Age/Sex: 80 y o  male    Assessment/Plan:   Principal Problem:    Acute respiratory failure with hypoxia (HCC)  Active Problems:    Sinus bradycardia    Essential hypertension    Pure hypercholesterolemia    Coronary artery disease involving native heart without angina pectoris    Non-ST elevation (NSTEMI) myocardial infarction ([facility] [address] )        Plan for the Primary Problem(s):  · Acute respiratory failure with hypoxia will continue on BiPAP consult Pulmonary service admitted to step-down will treat his initial exacerbation of CHF with elevation in troponin possibly from known ischemic heart disease and non ST elevation MI  ? Non ST elevation MI initial troponin 0 17 with T-wave changes consistent anterior ischemia will obtain 2D echocardiogram serial troponins continue aspirin cardiology on consult with presentation of known bradycardia withhold beta blockade  ? Essential hypertension had been on 20 mg lisinopril which will be continued  ? Acute congestive heart failure marked elevation in proBNP of over 19,000 obtain 2D echocardiogram and offer course of IV diuresis  ?  Sinus bradycardia by history remains on timolol drops no other chronotropic agent 1st degree AV block on EKG     Plan for Additional Problems:   cholesterolemia continue on statin changed to Lipitor  Although agrees to BiPAP does not want to be intubated or resuscitated  Takes iron supplement for anemia  Also has history of BPH     VTE Prophylaxis: Heparin  / sequential compression device   Code Status:  DNR DNI  POLST: POLST form is not discussed and not completed at this time      Anticipated Length of Stay:  Patient will be admitted on an Inpatient basis with an anticipated length of stay of  * more than 2 midnights  Justification for Hospital Stay:  NSTEMI CHF    Admission Orders:  ICU, telemetry, trop q3h  BIPAP  Cardiac diet  Echo  Consult pulmonology, cm  Sequential compression device  Blood cxs pending    Scheduled Meds:   Current Facility-Administered Medications:  acetaminophen 650 mg Oral Q6H PRN   aspirin 325 mg Oral Daily   atorvastatin 40 mg Oral Daily With Dinner   bisacodyl 5 mg Oral Daily PRN   chlorhexidine 15 mL Swish & Spit Q12H [facility]   docusate sodium 100 mg Oral Daily PRN   ENSURE PLUS 1 Can Oral BID   ferrous sulfate 325 mg Oral BID   finasteride 5 mg Oral Daily   fluticasone 1 spray Nasal BID   furosemide 40 mg Intravenous Daily   heparin (porcine) 5,000 Units Subcutaneous Q8H [facility]   latanoprost 1 drop Left Eye Daily   lisinopril 20 mg Oral Daily   [START ON 8/3/2018] pantoprazole 20 mg Oral Early Morning   timolol 1 drop Left Eye Daily     Continuous Infusions:    PRN Meds:   acetaminophen    bisacodyl    docusate sodium    Thank you,  Taqueria Rameshq  [facility] Utilization Review Department  Phone: [phone]; Fax [phone]  ATTENTION: The Network Utilization Review Department is now centralized for our 9 Facilities  Make a note that we have a new phone and fax numbers for our Department   Please call with any questions or concerns to [phone] and carefully follow the prompts so that you are directed to the right person  All voicemails are confidential  Fax any determinations, approvals, denials, and requests for initial or continue stay review clinical to [phone]  Due to HIGH CALL volume, it would be easier if you could please send faxed requests to expedite your requests and in part, help us provide discharge notifications faster

## 2018-08-02 NOTE — CONSULTS
Consultation - Pulmonary Medicine   James Tucker  80 y o  male MRN: [id number]  Unit/Bed#: ED 14 Encounter: [id number]      Assessment:  1  Acute hypoxic/hypercapnic respiratory failure  2  Acute Congestive heart failure  echocardiogram pending ,BNP 71845  3  Elevated troponin with ischemic T-wave changes  4  Sinus bradycardia    Plan:   1  Continue BiPAP  Wean off as tolerated  2  Titrate oxygen/FiO2 to maintain O2 saturation greater than 90%  3  Diuresis per Cardiology  4  Echocardiogram pending  5  Repeat chest x-ray in a m     08/02/2018 chest x-ray reviewed  Positive cephalization, small left pleural effusion and questionable left lower consolidation/nodule  Will repeat chest x-ray in a m  History of Present Illness   Physician Requesting Consult: Jia Mae DO  Reason for Consult / Principal Problem:  Hypoxia, acute heart failure, CAD  Hx and PE limited by:  Patient currently on BiPAP  HPI: James Tucker  is a 80y o  year old male who presented to the emergency department with weakness  Patient was reportedly going to his family doctor today when his neighbor went to pick him up and he could not get him into the vehicle because he was so weak  Neighbor called 911  Initial room air oxygen saturation was in the 60s and with high-flow able to achieve saturations in the 80s  Patient was placed on BiPAP current settings 12/5 70% with saturations now at 96%  ABG on those settings was 7 29, 68, 94, 32, 4 and 95%  Inpatient consult to Pulmonology  Consult performed by: Ruddy Naylro ordered by: Peter Flores          Review of Systems  Unable to obtain  Historical Information   Past Medical History:   Diagnosis Date    Anemia     Cardiac disease     GERD (gastroesophageal reflux disease)     History of BPH      History reviewed  No pertinent surgical history    Social History   History   Alcohol Use No     History   Drug Use No     History   Smoking Status    Never Smoker   Smokeless Tobacco    Never Used    Patient denies ever smoking  Occupational History:  Unclear    Family History: non-contributory    Meds/Allergies   all current active meds have been reviewed, pertinent pulmonary meds have been reviewed and current meds:   Current Facility-Administered Medications   Medication Dose Route Frequency    acetaminophen (TYLENOL) tablet 650 mg  650 mg Oral Q6H PRN    aspirin tablet 325 mg  325 mg Oral Daily    atorvastatin (LIPITOR) tablet 40 mg  40 mg Oral Daily With Dinner    bisacodyl (DULCOLAX) EC tablet 5 mg  5 mg Oral Daily PRN    chlorhexidine (PERIDEX) 0 12 % oral rinse 15 mL  15 mL Swish & Spit Q12H [address]    docusate sodium (COLACE) capsule 100 mg  100 mg Oral Daily PRN    ENSURE PLUS LIQD 237 mL  1 Can Oral BID    finasteride (PROSCAR) tablet 5 mg  5 mg Oral Daily    fluticasone (FLONASE) 50 mcg/act nasal spray 1 spray  1 spray Nasal BID    furosemide (LASIX) injection 40 mg  40 mg Intravenous Daily    heparin (porcine) subcutaneous injection 5,000 Units  5,000 Units Subcutaneous Q8H [address]    Iron TABS 324 mg  324 mg Oral BID    latanoprost (XALATAN) 0 005 % ophthalmic solution 1 drop  1 drop Left Eye Daily    lisinopril (ZESTRIL) tablet 20 mg  20 mg Oral Daily    pantoprazole (PROTONIX) EC tablet 20 mg  20 mg Oral Early Morning    timolol (TIMOPTIC) 0 25 % ophthalmic solution 1 drop  1 drop Left Eye Daily       No Known Allergies    Objective   Vitals: Blood pressure 135/60, pulse (!) 50, temperature 98 3 °F (36 8 °C), temperature source Oral, resp  rate (!) 24, weight 65 9 kg (145 lb 4 5 oz), SpO2 96 %  ,Body mass index is 24 18 kg/m²  No intake or output data in the 24 hours ending 08/02/18 1316  Invasive Devices     Peripheral Intravenous Line            Peripheral IV 08/02/18 Left Forearm less than 1 day    Peripheral IV 08/02/18 Right Forearm less than 1 day                Physical Exam   Constitutional: No distress     HENT:   Head: Normocephalic  Neck: Neck supple  Cardiovascular: Regular rhythm  Bradycardic   Pulmonary/Chest: He has no wheezes  He exhibits no tenderness  Few bibasilar rales  Patient remains on BiPAP   Abdominal: Soft  Bowel sounds are normal  There is no tenderness  Musculoskeletal: He exhibits edema  He exhibits no tenderness  Positive bilateral lower extremity edema   Lymphadenopathy:     He has no cervical adenopathy  Neurological:   Awakes when spoken too   Skin: He is not diaphoretic  Lab Results:   ABG:   Lab Results   Component Value Date    PHART 7 297 (L) 08/02/2018    ATT9OSQ 68 5 (HH) 08/02/2018    PO2ART 94 5 08/02/2018    YSX2YWR 32 7 (H) 08/02/2018    BEART 4 0 08/02/2018    SOURCE Radial, Right 08/02/2018   , BNP: No results found for: BNP, CBC:   Lab Results   Component Value Date    WBC 8 12 08/02/2018    HGB 14 4 08/02/2018    HCT 48 7 08/02/2018    MCV 99 (H) 08/02/2018     08/02/2018    MCH 29 1 08/02/2018    MCHC 29 6 (L) 08/02/2018    RDW 16 0 (H) 08/02/2018    MPV 9 1 08/02/2018   , CMP:   Lab Results   Component Value Date     08/02/2018    K 5 0 08/02/2018     08/02/2018    CO2 37 (H) 08/02/2018    ANIONGAP 3 (L) 08/02/2018    BUN 29 (H) 08/02/2018    CREATININE 1 24 08/02/2018    GLUCOSE 114 08/02/2018    CALCIUM 8 8 08/02/2018    AST 38 08/02/2018    ALT 41 08/02/2018    ALKPHOS 77 08/02/2018    PROT 6 7 08/02/2018    BILITOT 0 51 08/02/2018    EGFR 48 08/02/2018   , PT/INR:   Lab Results   Component Value Date    INR 1 04 08/02/2018   , Troponin:   Lab Results   Component Value Date    TROPONINI 0 17 (H) 08/02/2018     Imaging Studies: I have personally reviewed pertinent reports  and I have personally reviewed pertinent films in PACS  EKG, Pathology, and Other Studies: I have personally reviewed pertinent reports     and I have personally reviewed pertinent films in PACS  VTE Prophylaxis: Heparin    Code Status: Level 3 - DNAR and DNI

## 2018-08-02 NOTE — PLAN OF CARE
Problem: Potential for Falls  Goal: Patient will remain free of falls  INTERVENTIONS:  - Assess patient frequently for physical needs  -  Identify cognitive and physical deficits and behaviors that affect risk of falls  -  [place] fall precautions as indicated by assessment   - Educate patient/family on patient safety including physical limitations  - Instruct patient to call for assistance with activity based on assessment  - Modify environment to reduce risk of injury  - Consider OT/PT consult to assist with strengthening/mobility   Outcome: Progressing      Problem: Prexisting or High Potential for Compromised Skin Integrity  Goal: Skin integrity is maintained or improved  INTERVENTIONS:  - Identify patients at risk for skin breakdown  - Assess and monitor skin integrity  - Assess and monitor nutrition and hydration status  - Monitor labs (i e  albumin)  - Assess for incontinence   - Turn and reposition patient  - Assist with mobility/ambulation  - Relieve pressure over bony prominences  - Avoid friction and shearing  - Provide appropriate hygiene as needed including keeping skin clean and dry  - Evaluate need for skin moisturizer/barrier cream  - Collaborate with interdisciplinary team (i e  Nutrition, Rehabilitation, etc )   - Patient/family teaching   Outcome: Progressing      Problem: Nutrition/Hydration-ADULT  Goal: Nutrient/Hydration intake appropriate for improving, restoring or maintaining nutritional needs  Monitor and assess patient's nutrition/hydration status for malnutrition (ex- brittle hair, bruises, dry skin, pale skin and conjunctiva, muscle wasting, smooth red tongue, and disorientation)  Collaborate with interdisciplinary team and initiate plan and interventions as ordered  Monitor patient's weight and dietary intake as ordered or per policy  Utilize nutrition screening tool and intervene per policy   Determine patient's food preferences and provide high-protein, high-caloric foods as appropriate       INTERVENTIONS:  - Monitor oral intake, urinary output, labs, and treatment plans  - Assess nutrition and hydration status and recommend course of action  - Evaluate amount of meals eaten  - Assist patient with eating if necessary   - Allow adequate time for meals  - Recommend/ encourage appropriate diets, oral nutritional supplements, and vitamin/mineral supplements  - Order, calculate, and assess calorie counts as needed  - Recommend, monitor, and adjust tube feedings and TPN/PPN based on assessed needs  - Assess need for intravenous fluids  - Provide specific nutrition/hydration education as appropriate  - Include patient/family/caregiver in decisions related to nutrition   Outcome: Progressing      Problem: RESPIRATORY - ADULT  Goal: Achieves optimal ventilation and oxygenation  INTERVENTIONS:  - Assess for changes in respiratory status  - Assess for changes in mentation and behavior  - Position to facilitate oxygenation and minimize respiratory effort  - Oxygen administration by appropriate delivery method based on oxygen saturation (per order) or ABGs  - Initiate smoking cessation education as indicated  - Encourage broncho-pulmonary hygiene including cough, deep breathe, Incentive Spirometry  - Assess the need for suctioning and aspirate as needed  - Assess and instruct to report SOB or any respiratory difficulty  - Respiratory Therapy support as indicated  Outcome: Progressing      Problem: METABOLIC, FLUID AND ELECTROLYTES - ADULT  Goal: Electrolytes maintained within normal limits  INTERVENTIONS:  - Monitor labs and assess patient for signs and symptoms of electrolyte imbalances  - Administer electrolyte replacement as ordered  - Monitor response to electrolyte replacements, including repeat lab results as appropriate  - Instruct patient on fluid and nutrition as appropriate  Outcome: Progressing    Goal: Fluid balance maintained  INTERVENTIONS:  - Monitor labs and assess for signs and symptoms of volume excess or deficit  - Monitor I/O and WT  - Instruct patient on fluid and nutrition as appropriate  Outcome: Progressing      Problem: SKIN/TISSUE INTEGRITY - ADULT  Goal: Skin integrity remains intact  INTERVENTIONS  - Identify patients at risk for skin breakdown  - Assess and monitor skin integrity  - Assess and monitor nutrition and hydration status  - Monitor labs (i e  albumin)  - Assess for incontinence   - Turn and reposition patient  - Assist with mobility/ambulation  - Relieve pressure over bony prominences  - Avoid friction and shearing  - Provide appropriate hygiene as needed including keeping skin clean and dry  - Evaluate need for skin moisturizer/barrier cream  - Collaborate with interdisciplinary team (i e  Nutrition, Rehabilitation, etc )   - Patient/family teaching  Outcome: Progressing      Problem: MUSCULOSKELETAL - ADULT  Goal: Maintain or return mobility to safest level of function  INTERVENTIONS:  - Assess patient's ability to carry out ADLs; assess patient's baseline for ADL function and identify physical deficits which impact ability to perform ADLs (bathing, care of mouth/teeth, toileting, grooming, dressing, etc )  - Assess/evaluate cause of self-care deficits   - Assess range of motion  - Assess patient's mobility; develop plan if impaired  - Assess patient's need for assistive devices and provide as appropriate  - Encourage maximum independence but intervene and supervise when necessary  - Involve family in performance of ADLs  - Assess for home care needs following discharge   - Request OT consult to assist with ADL evaluation and planning for discharge  - Provide patient education as appropriate  Outcome: Progressing

## 2018-08-02 NOTE — H&P
History and Physical - [facility] Internal Medicine    Patient Information: Stephanie Tenorio  80 y o  male MRN: [id number]  Unit/Bed#: ED 14 Encounter: [id number]  Admitting Physician: Malena Rob MD  PCP: Edward Bloom MD  Date of Admission:  08/02/18    Assessment/Plan:  70-year-old male with a known history of coronary artery disease in distant inferior wall myocardial infarction who developed last 2 days increasing leg weakness inability ambulate and was found to be profoundly hypoxic by paramedics and on presentation to ED requiring BiPAP after pulse ox noted in the 60s    Initial troponin 0 17 and EKG noting ischemic T-wave changes in inferior and anterior lateral leads/initial blood gas with only minutes of BiPAP placed pH is 7 29 pCO2 68 with PO2 of 94 bicarb 32 chest x-ray shows only basilar atelectasis D-dimer of 717 in this age group considered within normal limits    Hospital Problem List:     Principal Problem:    Acute respiratory failure with [facility] ([facility] )  Active Problems:    Sinus bradycardia    Essential hypertension    Pure hypercholesterolemia    Coronary artery disease involving native heart without angina pectoris    Non-ST elevation (NSTEMI) myocardial infarction ([facility] )      Plan for the Primary Problem(s):  · Acute respiratory failure with hypoxia will continue on BiPAP consult Pulmonary service admitted to step-down will treat his initial exacerbation of CHF with elevation in troponin possibly from known ischemic heart disease and non ST elevation MI  · Non ST elevation MI initial troponin 0 17 with T-wave changes consistent anterior ischemia will obtain 2D echocardiogram serial troponins continue aspirin cardiology on consult with presentation of known bradycardia withhold beta blockade  · Essential hypertension had been on 20 mg lisinopril which will be continued  · Acute congestive heart failure marked elevation in proBNP of over 19,000 obtain 2D echocardiogram and offer course of IV diuresis  · Sinus bradycardia by history remains on timolol drops no other chronotropic agent 1st degree AV block on EKG    Plan for Additional Problems:   cholesterolemia continue on statin changed to Lipitor  Although agrees to BiPAP does not want to be intubated or resuscitated  Takes iron supplement for anemia  Also has history of BPH    VTE Prophylaxis: Heparin  / sequential compression device   Code Status:  DNR DNI  POLST: POLST form is not discussed and not completed at this time  Anticipated Length of Stay:  Patient will be admitted on an Inpatient basis with an anticipated length of stay of  * more than 2 midnights  Justification for Hospital Stay:  NSTEMI CHF    Total Time for Visit, including Counseling / Coordination of Care: 1 hour  Greater than 50% of this total time spent on direct patient counseling and coordination of care  Chief Complaint:   Leg weakness    History of Present Illness:    Aileen Lock  is a 80 y o  male who presents with * initial complaint of leg weakness found to be profoundly hypoxic requiring BiPAP resuscitation  He states he has been functioning fairly well at his previous levels but developed rather sudden onset of progressing leg weakness in last 2 days he denies chest pain denies shortness of breath with this but presented profoundly hypoxic to the ED in the 60s pulse ox and required BiPAP resuscitation  He lives alone since his son passed away at the age of 76 4 years ago  He sees Dr Shaw Albrecht cardiologist routinely with no significant medication changes on inspection of medical record could not find recent echocardiogram/    Review of Systems:    Review of Systems   Constitutional: Positive for activity change  HENT: Negative  Eyes: Negative  Respiratory: Positive for shortness of breath  Cardiovascular: Negative  Gastrointestinal: Negative  Endocrine: Negative  Genitourinary: Negative  Musculoskeletal: Negative  Skin: Negative  Allergic/Immunologic: Negative  Neurological: Negative  Hematological: Negative  Psychiatric/Behavioral: Negative  Past Medical and Surgical History:     Past Medical History:   Diagnosis Date    Anemia     Cardiac disease     GERD (gastroesophageal reflux disease)     History of BPH        History reviewed  No pertinent surgical history  Meds/Allergies:    Prior to Admission medications    Medication Sig Start Date End Date Taking?  Authorizing Provider   aspirin 325 mg tablet Take 81 mg by mouth daily     Yes Historical Provider, MD   finasteride (PROSCAR) 5 mg tablet Take 5 mg by mouth daily   Yes Historical Provider, MD   fluticasone (FLONASE) 50 mcg/act nasal spray 1 spray into each nostril 2 (two) times a day   Yes Historical Provider, MD   IRON PO Take 324 mg by mouth 2 (two) times a day     Yes Historical Provider, MD   latanoprost (XALATAN) 0 005 % ophthalmic solution Administer 1 drop into the left eye daily     Yes Historical Provider, MD   lisinopril (ZESTRIL) 20 mg tablet Take 20 mg by mouth 2 (two) times a day   Yes Historical Provider, MD   MENTHOL-METHYL SALICYLATE EX Apply topically 2 (two) times a day   Yes Historical Provider, MD   Nutritional Supplements (ENSURE PLUS) LIQD Take 1 Can by mouth 2 (two) times a day     Yes Historical Provider, MD   omeprazole (PriLOSEC) 10 mg delayed release capsule Take 20 mg by mouth daily   Yes Historical Provider, MD   simvastatin (ZOCOR) 80 mg tablet Take 40 mg by mouth daily at bedtime   Yes Historical Provider, MD   timolol (TIMOPTIC) 0 25 % ophthalmic solution Administer 1 drop into the left eye daily     Yes Historical Provider, MD   acetaminophen (TYLENOL) 325 mg tablet Take 650 mg by mouth every 6 (six) hours as needed for mild pain    Historical Provider, MD   bisacodyl (DULCOLAX) 5 mg EC tablet Take 5 mg by mouth daily as needed for constipation    Historical Provider, MD   cetirizine (ZyrTEC) 5 MG tablet Take 5 mg by mouth daily as needed for allergies    Historical Provider, MD   docusate sodium (COLACE) 100 mg capsule Take 100 mg by mouth daily as needed for constipation    Historical Provider, MD   nitroglycerin (NITROSTAT) 0 4 mg SL tablet Place 0 4 mg under the tongue every 5 (five) minutes as needed for chest pain  8/2/18  Historical Provider, MD   Sennosides (SENOKOT PO) Take by mouth  8/2/18  Historical Provider, MD     I have reviewed home medications with patient personally  Allergies: No Known Allergies    Social History:     Marital Status: Single   Occupation:  Retired  Patient Pre-hospital Living Situation:  Lives alone  Patient Pre-hospital Level of Mobility:  Unclear whether uses assistive device  Patient Pre-hospital Diet Restrictions:  No restriction  Substance Use History:   History   Alcohol Use No     History   Smoking Status    Never Smoker   Smokeless Tobacco    Never Used     History   Drug Use No       Family History:    non-contributory    Physical Exam:     Vitals:   Blood Pressure: 135/60 (08/02/18 1245)  Pulse: (!) 54 (08/02/18 1245)  Temperature: 98 3 °F (36 8 °C) (08/02/18 1035)  Temp Source: Oral (08/02/18 1035)  Respirations: (!) 24 (08/02/18 1245)  Weight - Scale: 65 9 kg (145 lb 4 5 oz) (08/02/18 1035)  SpO2: 95 % (08/02/18 1245)       General appearance: alert, pale, appears stated age and cooperative  Head: Normocephalic, without obvious abnormality, atraumatic  Lungs: diminished breath sounds  Heart: regular rate and rhythm bradycardic  Abdomen: soft, non-tender; bowel sounds normal; no masses,  no organomegaly  Back: negative  Extremities: extremities normal, atraumatic, no cyanosis or edema mycotic nails  Neurologic: Grossly normal         Additional Data:     Lab Results: I have personally reviewed pertinent reports          Results from last 7 days  Lab Units 08/02/18  1050   WBC Thousand/uL 8 12   HEMOGLOBIN g/dL 14 4   HEMATOCRIT % 48 7   PLATELETS Thousands/uL 243   NEUTROS PCT % 75 LYMPHS PCT % 15   MONOS PCT % 10   EOS PCT % 1       Results from last 7 days  Lab Units 08/02/18  1052   SODIUM mmol/L 142   POTASSIUM mmol/L 5 0   CHLORIDE mmol/L 102   CO2 mmol/L 37*   BUN mg/dL 29*   CREATININE mg/dL 1 24   CALCIUM mg/dL 8 8   TOTAL PROTEIN g/dL 6 7   BILIRUBIN TOTAL mg/dL 0 51   ALK PHOS U/L 77   ALT U/L 41   AST U/L 38   GLUCOSE RANDOM mg/dL 114       Results from last 7 days  Lab Units 08/02/18  1054   INR  1 04       Imaging: I have personally reviewed pertinent reports  Xr Chest 1 View Portable    Result Date: 8/2/2018  Narrative: CHEST INDICATION:   waek and hypoxic  COMPARISON:  7/11/2017 EXAM PERFORMED/VIEWS:  XR CHEST PORTABLE FINDINGS: Cardiomediastinal silhouette appears unremarkable  Bibasilar atelectasis is present  Lungs are otherwise clear  Osseous structures appear within normal limits for patient age  Impression: Bibasilar atelectasis  Workstation performed: [id number]       EKG, Pathology, and Other Studies Reviewed on Admission:   · EKG:  First-degree AV block in presence of sinus rhythm with inverted T-waves in leads 3 subtle lead AVF and inverted V1 through V4 with QS in V1 through V3    [employer] Records Reviewed: Yes     ** Please Note: Dragon 360 Dictation voice to text software may have been used in the creation of this document   **

## 2018-08-02 NOTE — CONSULTS
Consult - Cardiology   Deepti Collier  80 y o  male MRN: [id number]  Unit/Bed#: ED 14 Encounter: [id number]        Reason For Consult:  Respiratory failure, bradycardia               Assessment and Plan:     1  Ambulatory dysfunction, generalized weakness, and possible fall:  History does not endorse syncope or presyncope  2  Acute hypoxic and hypercarbic respiratory failure:  Some component of congestive heart failure seems plausible though radiograph and lung auscultation do not suggest significant pulmonary edema  Follow clinical course with consideration of noncardiogenic contributors  3  History of CAD and remote inferior myocardial infarction:  Current ECG with sign of age-indeterminate anteroseptal infarct with nonspecific T-wave inversion  4   Elevated troponin - probable non STEMI:  Suspect type 2 secondary to hypoxic respiratory failure  5  Sinus bradycardia:  Known history of the same with current heart rate 50-low 60s without evidence of any high-grade block nor symptomatic corollary    · Continue BiPAP, deferring to pulmonology for weaning  · Agree with empiric diuresis following clinical response  · Echocardiogram forthcoming, though unfortunately no prior study for comparison  · Complete balance of MI profile though some upward trend is not unexpected  · Continue aspirin without anticoagulant at this time  Check ECG p r n  for pain and continue telemetry for surveillance of rate/rhythm      History Of Present Illness:  Mr Jolynn Wang is a 27-year-old, long-time patient of Dr Gaurav Alexandra (cardiologist) with history of previous inferior wall myocardial infarction several decades ago, hypertension, dyslipidemia and a longstanding history of sinus bradycardia  It appears his last stress test was performed in 2004 reporting nontransmural inferior wall MI with mild jessica-infarct ischemia involving the distal apical inferior and basal inferior region with calculated LVEF of 59%    Available records did not indicate a history of valvular heart disease and I find no prior echocardiogram  He also appears to have a longstanding history of ambulatory dysfunction, with prior falls  Mr Stephanie Escudero was recently seen in our office by Dr Yusef Gray for a routine visit just weeks ago on 7/11/2018  At that visit the patient denied any increased symptoms of chest pain, dyspnea, orthopnea, PND or syncope  He remain on a full dose of aspirin, simvastatin 80 mg as well as Zestril for his hypertension  Because of his bradycardia he was on no beta-blocker despite his history of CAD  Today the patient was brought to the emergency department by EMS  While the details are unclear the patient indicates that his legs gave out  It is suggested that he was ambulating at the time when his legs became weak and he was unable to bear his weight  He was unable to get up despite assistance of a friend  He denies any loss of consciousness  The patient reports to me that at baseline he utilizes a walker and a wheelchair  Upon EMS arrival the patient was allegedly observed appear dyspneic with documentation of hypoxia  Initial saturation in the emergency department is recorded as 60% though accuracy is unclear  Initial ABG obtained just after application of BiPAP reveals hypercarbia and respiratory acidosis  Patient denies any recent chest pain, nitrate use, presyncope or syncope  He does admit to some intermittent and mild lightheadedness not clearly related to positional change  He has had no subjective fever, chills, cough or expectoration  Initial troponin was 0 17 with proBNP level in excess of 19,000  These and concern for ECG abnormalities prompt our consultation  Past Medical History:        Past Medical History:   Diagnosis Date    Anemia     Cardiac disease     GERD (gastroesophageal reflux disease)     History of BPH     History reviewed  No pertinent surgical history       Allergy:        No Known Allergies    Medications:       Prior to Admission medications    Medication Sig Start Date End Date Taking?  Authorizing Provider   aspirin 325 mg tablet Take 81 mg by mouth daily     Yes Historical Provider, MD   finasteride (PROSCAR) 5 mg tablet Take 5 mg by mouth daily   Yes Historical Provider, MD   fluticasone (FLONASE) 50 mcg/act nasal spray 1 spray into each nostril 2 (two) times a day   Yes Historical Provider, MD   IRON PO Take 324 mg by mouth 2 (two) times a day     Yes Historical Provider, MD   latanoprost (XALATAN) 0 005 % ophthalmic solution Administer 1 drop into the left eye daily     Yes Historical Provider, MD   lisinopril (ZESTRIL) 20 mg tablet Take 20 mg by mouth 2 (two) times a day   Yes Historical Provider, MD   MENTHOL-METHYL SALICYLATE EX Apply topically 2 (two) times a day   Yes Historical Provider, MD   Nutritional Supplements (ENSURE PLUS) LIQD Take 1 Can by mouth 2 (two) times a day     Yes Historical Provider, MD   omeprazole (PriLOSEC) 10 mg delayed release capsule Take 20 mg by mouth daily   Yes Historical Provider, MD   simvastatin (ZOCOR) 80 mg tablet Take 40 mg by mouth daily at bedtime   Yes Historical Provider, MD   timolol (TIMOPTIC) 0 25 % ophthalmic solution Administer 1 drop into the left eye daily     Yes Historical Provider, MD   acetaminophen (TYLENOL) 325 mg tablet Take 650 mg by mouth every 6 (six) hours as needed for mild pain    Historical Provider, MD   bisacodyl (DULCOLAX) 5 mg EC tablet Take 5 mg by mouth daily as needed for constipation    Historical Provider, MD   cetirizine (ZyrTEC) 5 MG tablet Take 5 mg by mouth daily as needed for allergies    Historical Provider, MD   docusate sodium (COLACE) 100 mg capsule Take 100 mg by mouth daily as needed for constipation    Historical Provider, MD   nitroglycerin (NITROSTAT) 0 4 mg SL tablet Place 0 4 mg under the tongue every 5 (five) minutes as needed for chest pain  8/2/18  Historical Provider, [facility] Take by mouth  8/2/18  Historical Provider, MD       Family History:     History reviewed  No pertinent family history  Social History:       Social History     Social History    Marital status: Single     Spouse name: N/A    Number of children: N/A    Years of education: N/A     Social History Main Topics    Smoking status: Never Smoker    Smokeless tobacco: Never Used    Alcohol use No    Drug use: No    Sexual activity: Not Asked     Other Topics Concern    None     Social History Narrative    None       ROS:  Symptoms per HPI  Very hard of hearing  Arthralgias    Exam:  General:  Arousable and normally conversant  Appears somewhat frail  Head: Normocephalic, atraumatic  Eyes:  EOMI  Pupils - equal, round, reactive to accomodation  No icterus  Pale Conjunctiva  Oropharynx:  Not closely evaluated because of wearing BiPAP  Neck:  Could not auscultate for bruit as BiPAP overshadowed examination  Heart:  Currently regular with slow rate  Mild basilar KAREN with preserved S2  Lungs:  Few course rales mostly at lingula and left base  Abdomen:   Soft and nontender with normal bowel sounds  No organomegaly or mass  Lower Limbs:  1-2 +pitting edema mid tibia through forefoot  Pulses[de-identified]  RLE - DP:  1+                 LLE - DP:  1+  Musculoskeletal: Independent movement of limbs observed, Formal ROM and strength eval not performed  Neurologic:    Oriented to: person , place, situation  Cranial Nerves: grossly intact - vision, smell, taste, and hearing  were not tested  Motor function: grossly normal, symmetric   Sensation: Was not tested    DATA:       Telemetry:  Normal sinus rhythm with ventricular rate 50-low 60s without other ectopy or high-grade AV block                Weights: Wt Readings from Last 3 Encounters:   08/02/18 65 9 kg (145 lb 4 5 oz)   07/11/18 54 4 kg (120 lb)   04/18/18 54 kg (119 lb)   , Body mass index is 24 18 kg/m²           Lab Studies:      Results from last 7 days  Lab Units 08/02/18  1051   TROPONIN I ng/mL 0 17*            Results from last 7 days  Lab Units 08/02/18  1050   WBC Thousand/uL 8 12   HEMOGLOBIN g/dL 14 4   HEMATOCRIT % 48 7   PLATELETS Thousands/uL 243   ,   Results from last 7 days  Lab Units 08/02/18  1052   SODIUM mmol/L 142   POTASSIUM mmol/L 5 0   CHLORIDE mmol/L 102   CO2 mmol/L 37*   BUN mg/dL 29*   CREATININE mg/dL 1 24   CALCIUM mg/dL 8 8   TOTAL PROTEIN g/dL 6 7   BILIRUBIN TOTAL mg/dL 0 51   ALK PHOS U/L 77   ALT U/L 41   AST U/L 38   GLUCOSE RANDOM mg/dL 114

## 2018-08-03 ENCOUNTER — APPOINTMENT (INPATIENT)
Dept: NON INVASIVE DIAGNOSTICS | Facility: HOSPITAL | Age: 83
DRG: 280 | End: 2018-08-03
Payer: OTHER GOVERNMENT

## 2018-08-03 PROBLEM — J96.02 ACUTE RESPIRATORY FAILURE WITH HYPOXIA AND HYPERCARBIA (HCC): Status: ACTIVE | Noted: 2018-08-03

## 2018-08-03 PROBLEM — E87.5 HYPERKALEMIA: Status: ACTIVE | Noted: 2018-08-03

## 2018-08-03 PROBLEM — J96.01 ACUTE RESPIRATORY FAILURE WITH HYPOXIA AND HYPERCARBIA (HCC): Status: ACTIVE | Noted: 2018-08-03

## 2018-08-03 LAB
ANION GAP SERPL CALCULATED.3IONS-SCNC: -1 MMOL/L (ref 4–13)
ATRIAL RATE: 54 BPM
ATRIAL RATE: 62 BPM
BASOPHILS # BLD AUTO: 0.02 THOUSANDS/ΜL (ref 0–0.1)
BASOPHILS NFR BLD AUTO: 0 % (ref 0–1)
BUN SERPL-MCNC: 32 MG/DL (ref 5–25)
CALCIUM SERPL-MCNC: 8.9 MG/DL (ref 8.3–10.1)
CHLORIDE SERPL-SCNC: 104 MMOL/L (ref 100–108)
CO2 SERPL-SCNC: 38 MMOL/L (ref 21–32)
CREAT SERPL-MCNC: 1.12 MG/DL (ref 0.6–1.3)
EOSINOPHIL # BLD AUTO: 0.03 THOUSAND/ΜL (ref 0–0.61)
EOSINOPHIL NFR BLD AUTO: 0 % (ref 0–6)
ERYTHROCYTE [DISTWIDTH] IN BLOOD BY AUTOMATED COUNT: 15.6 % (ref 11.6–15.1)
GFR SERPL CREATININE-BSD FRML MDRD: 54 ML/MIN/1.73SQ M
GLUCOSE SERPL-MCNC: 65 MG/DL (ref 65–140)
HCT VFR BLD AUTO: 48.9 % (ref 36.5–49.3)
HGB BLD-MCNC: 14.3 G/DL (ref 12–17)
LYMPHOCYTES # BLD AUTO: 1.21 THOUSANDS/ΜL (ref 0.6–4.47)
LYMPHOCYTES NFR BLD AUTO: 17 % (ref 14–44)
MCH RBC QN AUTO: 29.7 PG (ref 26.8–34.3)
MCHC RBC AUTO-ENTMCNC: 29.2 G/DL (ref 31.4–37.4)
MCV RBC AUTO: 102 FL (ref 82–98)
MONOCYTES # BLD AUTO: 0.62 THOUSAND/ΜL (ref 0.17–1.22)
MONOCYTES NFR BLD AUTO: 9 % (ref 4–12)
NEUTROPHILS # BLD AUTO: 5.13 THOUSANDS/ΜL (ref 1.85–7.62)
NEUTS SEG NFR BLD AUTO: 73 % (ref 43–75)
NRBC BLD AUTO-RTO: 0 /100 WBCS
P AXIS: 68 DEGREES
P AXIS: 73 DEGREES
PLATELET # BLD AUTO: 179 THOUSANDS/UL (ref 149–390)
PMV BLD AUTO: 10.8 FL (ref 8.9–12.7)
POTASSIUM SERPL-SCNC: 5.7 MMOL/L (ref 3.5–5.3)
PR INTERVAL: 236 MS
PR INTERVAL: 248 MS
QRS AXIS: 147 DEGREES
QRS AXIS: 154 DEGREES
QRSD INTERVAL: 76 MS
QRSD INTERVAL: 78 MS
QT INTERVAL: 412 MS
QT INTERVAL: 444 MS
QTC INTERVAL: 418 MS
QTC INTERVAL: 421 MS
RBC # BLD AUTO: 4.81 MILLION/UL (ref 3.88–5.62)
SODIUM SERPL-SCNC: 141 MMOL/L (ref 136–145)
T WAVE AXIS: -14 DEGREES
T WAVE AXIS: -38 DEGREES
VENTRICULAR RATE: 54 BPM
VENTRICULAR RATE: 62 BPM
WBC # BLD AUTO: 7.01 THOUSAND/UL (ref 4.31–10.16)

## 2018-08-03 PROCEDURE — 93308 TTE F-UP OR LMTD: CPT

## 2018-08-03 PROCEDURE — 93308 TTE F-UP OR LMTD: CPT | Performed by: INTERNAL MEDICINE

## 2018-08-03 PROCEDURE — 93010 ELECTROCARDIOGRAM REPORT: CPT | Performed by: INTERNAL MEDICINE

## 2018-08-03 PROCEDURE — 99232 SBSQ HOSP IP/OBS MODERATE 35: CPT | Performed by: INTERNAL MEDICINE

## 2018-08-03 PROCEDURE — 93325 DOPPLER ECHO COLOR FLOW MAPG: CPT | Performed by: INTERNAL MEDICINE

## 2018-08-03 PROCEDURE — 85025 COMPLETE CBC W/AUTO DIFF WBC: CPT | Performed by: INTERNAL MEDICINE

## 2018-08-03 PROCEDURE — 80048 BASIC METABOLIC PNL TOTAL CA: CPT | Performed by: INTERNAL MEDICINE

## 2018-08-03 PROCEDURE — 93321 DOPPLER ECHO F-UP/LMTD STD: CPT | Performed by: INTERNAL MEDICINE

## 2018-08-03 PROCEDURE — 99291 CRITICAL CARE FIRST HOUR: CPT | Performed by: INTERNAL MEDICINE

## 2018-08-03 RX ORDER — ASPIRIN 81 MG/1
81 TABLET ORAL DAILY
Status: DISCONTINUED | OUTPATIENT
Start: 2018-08-04 | End: 2018-08-10 | Stop reason: HOSPADM

## 2018-08-03 RX ADMIN — FUROSEMIDE 40 MG: 10 INJECTION, SOLUTION INTRAMUSCULAR; INTRAVENOUS at 09:44

## 2018-08-03 RX ADMIN — Medication 325 MG: at 09:41

## 2018-08-03 RX ADMIN — CHLORHEXIDINE GLUCONATE 15 ML: 1.2 RINSE ORAL at 09:55

## 2018-08-03 RX ADMIN — FINASTERIDE 5 MG: 5 TABLET, FILM COATED ORAL at 09:44

## 2018-08-03 RX ADMIN — HEPARIN SODIUM 5000 UNITS: 5000 INJECTION, SOLUTION INTRAVENOUS; SUBCUTANEOUS at 21:20

## 2018-08-03 RX ADMIN — HEPARIN SODIUM 5000 UNITS: 5000 INJECTION, SOLUTION INTRAVENOUS; SUBCUTANEOUS at 05:08

## 2018-08-03 RX ADMIN — ATORVASTATIN CALCIUM 40 MG: 40 TABLET, FILM COATED ORAL at 17:21

## 2018-08-03 RX ADMIN — Medication 325 MG: at 17:21

## 2018-08-03 RX ADMIN — FLUTICASONE PROPIONATE 1 SPRAY: 50 SPRAY, METERED NASAL at 09:44

## 2018-08-03 RX ADMIN — LATANOPROST 1 DROP: 50 SOLUTION OPHTHALMIC at 09:44

## 2018-08-03 RX ADMIN — FLUTICASONE PROPIONATE 1 SPRAY: 50 SPRAY, METERED NASAL at 17:22

## 2018-08-03 RX ADMIN — Medication 237 ML: at 17:22

## 2018-08-03 RX ADMIN — TIMOLOL MALEATE 1 DROP: 2.5 SOLUTION OPHTHALMIC at 09:44

## 2018-08-03 RX ADMIN — PANTOPRAZOLE SODIUM 20 MG: 20 TABLET, DELAYED RELEASE ORAL at 05:08

## 2018-08-03 NOTE — CASE MANAGEMENT
Notification of Inpatient Admission/Inpatient Authorization Request  This is a Notification of Inpatient Admission/Request for Inpatient Authorization to our facility [address]  Please be advised that this patient is currently in our facility under Inpatient Status  Below you will find the Attending Physician and Facilitys information including NPI# and contact information for the Utilization  assigned to the [facility] where the patient is receiving services  Please feel free to contact the Utilization Review Department with any questions  Patient Information:  PATIENT NAME: Otilia Guillaume  MRN: [id number]  YOB: 1920    PRESENTATION DATE: 8/2/2018 10:31 AM  IP ADMISSION DATE: 8/2/18 1217  DISCHARGE DATE: No discharge date for patient encounter  DISPOSITION: Home/Self Care    Attending Physician:  LISA Castañeda  Specialty- Internal Medicine  [facility] ID- [id number]  [address] [place] [address]  Phone 1: [phone]  Fax: [phone]  Facility:  [address]  [phone]  Tax ID: [id number]  NPI: [id number]    Thank you,  Taqueria Aqq  291 Utilization Review Department  Phone: [phone]; Fax [phone]  ATTENTION: The Network Utilization Review Department is now centralized for our 9 Facilities  Make a note that we have a new phone and fax numbers for our Department  Please call with any questions or concerns to [phone] and carefully follow the prompts so that you are directed to the right person  All voicemails are confidential  Fax any determinations, approvals, denials, and requests for initial or continue stay review clinical to [phone]   Due to HIGH CALL volume, it would be easier if you could please send faxed requests to expedite your requests and in part, help us provide discharge notifications faster

## 2018-08-03 NOTE — PROGRESS NOTES
[address] Internal [facility] Progress Note  Patient: Lacy Bran  80 y o  male   MRN: [id number]  PCP: Melecio Bran MD  Unit/Bed#: ICU 09 Encounter: [id number]  Date Of Visit: 08/03/18    Assessment:  75-year-old male with a known history of coronary artery disease in distant inferior wall myocardial infarction who developed last 2 days increasing leg weakness inability ambulate and was found to be profoundly hypoxic by paramedics and on presentation to ED requiring BiPAP after pulse ox noted in the 60s    Initial troponin 0 17 and EKG noting ischemic T-wave changes in inferior and anterior lateral leads/initial blood gas with only minutes of BiPAP placed pH is 7 29 pCO2 68 with PO2 of 94 bicarb 32 chest x-ray shows only basilar atelectasis D-dimer of 717 in this age group considered within normal limits    Principal Problem:    Acute respiratory failure with hypoxia and hypercarbia (HCC)  Active Problems:    Sinus bradycardia    Essential hypertension    Pure hypercholesterolemia    Coronary artery disease involving native heart without angina pectoris    Non-ST elevation (NSTEMI) myocardial infarction (HCC)    Hyperkalemia      Plan:    · Acute respiratory failure with hypoxia and hypercarbia and actually was taken off BiPAP overnight but redeveloped somnolence and decreased level response requiring resumption of BiPAP probable in relation to acute diastolic heart failure will continue IV Lasix await 2D echocardiogram/no signs of infection/patient expressed to me would not want intubation  · Non ST elevation MI with serial troponins up 0 2 4 continue on aspirin and statin not a candidate for beta-blockade due to bradycardia by history not a candidate for intervention otherwise  · Hyperkalemia ACE-inhibitor discontinued monitor BMP  · Essential hypertension by history however remains normal tensive here and ACE-inhibitor discontinued in face of also hyperkalemia and NSTEMI  · Hyperlipidemia will continue on statin      VTE Pharmacologic Prophylaxis:   Pharmacologic: Heparin  Mechanical VTE Prophylaxis in Place: Yes    Discussions with Specialists or Other Care Team Provider:  Now    Time Spent for Care: 45 minutes  More than 50% of total time spent on counseling and coordination of care as described above  Subjective:   Presently somnolent and desaturated requiring resumption of BiPAP now more responsive at being placed denying chest pain but also denies shortness of breath earlier today was which requesting meal was conversant with nursing staff    Objective:     Vitals:   Temp (24hrs), Av °F (37 2 °C), Min:98 5 °F (36 9 °C), Max:99 7 °F (37 6 °C)    HR:  [50-64] 60  Resp:  [20-28] 25  BP: (102-170)/(57-88) 151/57  SpO2:  [92 %-100 %] 96 %  Body mass index is 24 18 kg/m²  Input and Output Summary (last 24 hours):        Intake/Output Summary (Last 24 hours) at 18 1104  Last data filed at 18 0504   Gross per 24 hour   Intake                0 ml   Output             1075 ml   Net            -1075 ml       Physical Exam:     Physical Exam:   General appearance: delirious, fatigued, appears stated age and cooperative  Head: Normocephalic, without obvious abnormality, atraumatic  Lungs: diminished breath sounds  Heart: regular rate and rhythm  Abdomen: soft, non-tender; bowel sounds normal; no masses,  no organomegaly  Back: negative  Extremities: extremities normal, atraumatic, no cyanosis or edema  Neurologic: Mental status: Somnolent difficult to arouse hard of hearing      Additional Data:     Labs:      Results from last 7 days  Lab Units 18  0513   WBC Thousand/uL 7 01   HEMOGLOBIN g/dL 14 3   HEMATOCRIT % 48 9   PLATELETS Thousands/uL 179   NEUTROS PCT % 73   LYMPHS PCT % 17   MONOS PCT % 9   EOS PCT % 0       Results from last 7 days  Lab Units 18  0513 18  1052   SODIUM mmol/L 141 142   POTASSIUM mmol/L 5 7* 5 0   CHLORIDE mmol/L 104 102   CO2 mmol/L 38* 37*   BUN mg/dL 32* 29*   CREATININE mg/dL 1 12 1 24   CALCIUM mg/dL 8 9 8 8   TOTAL PROTEIN g/dL  --  6 7   BILIRUBIN TOTAL mg/dL  --  0 51   ALK PHOS U/L  --  77   ALT U/L  --  41   AST U/L  --  38   GLUCOSE RANDOM mg/dL 65 114       Results from last 7 days  Lab Units 08/02/18  1054   INR  1 04       * I Have Reviewed All Lab Data Listed Above  * Additional Pertinent Lab Tests Reviewed: All Labs For Current Hospital Admission Reviewed    Imaging:  Xr Chest 1 View Portable    Result Date: 8/2/2018  Narrative: CHEST INDICATION:   waek and hypoxic  COMPARISON:  7/11/2017 EXAM PERFORMED/VIEWS:  XR CHEST PORTABLE FINDINGS: Cardiomediastinal silhouette appears unremarkable  Bibasilar atelectasis is present  Lungs are otherwise clear  Osseous structures appear within normal limits for patient age  Impression: Bibasilar atelectasis  Workstation performed: [id number]     Imaging Reports Reviewed Today Include:  Chest x-ray reviewed  Imaging Personally Reviewed by Myself Includes:  No  Procedure: Xr Chest 1 View Portable    Result Date: 8/2/2018  Narrative: CHEST INDICATION:   waek and hypoxic  COMPARISON:  7/11/2017 EXAM PERFORMED/VIEWS:  XR CHEST PORTABLE FINDINGS: Cardiomediastinal silhouette appears unremarkable  Bibasilar atelectasis is present  Lungs are otherwise clear  Osseous structures appear within normal limits for patient age  Impression: Bibasilar atelectasis   Workstation performed: [id number]        Recent Cultures (last 7 days):           Last 24 Hours Medication List:     Current Facility-Administered Medications:  acetaminophen 650 mg Oral Q6H PRN Jaylen Shin MD   [START ON 8/4/2018] aspirin 81 mg Oral Daily Syed Stephens MD   atorvastatin 40 mg Oral Daily With Pawel Oconnor MD   bisacodyl 5 mg Oral Daily PRN Jaylen Shin MD   chlorhexidine 15 mL Swish & Spit Q12H [address] Jaylen Shin MD   docusate sodium 100 mg Oral Daily PRN Jaylen Shin MD   ENSURE PLUS 1 Can Oral BID Ruth Calle MD   ferrous sulfate 325 mg Oral BID Ruth Calle MD   finasteride 5 mg Oral Daily Ruth Calle MD   fluticasone 1 spray Nasal BID Ruth Calle MD   furosemide 40 mg Intravenous Daily Ruth Calle MD   heparin (porcine) 5,000 Units Subcutaneous [facility] Ruth Calle MD   latanoprost 1 drop Left Eye Daily Ruth Calle MD   pantoprazole 20 mg Oral Early Morning Ruth Calle MD   timolol 1 drop Left Eye Daily Ruth Calle MD        Today, Patient Was Seen By: Ruth Calle MD    ** Please Note: Dragon 360 Dictation voice to text software may have been used in the creation of this document   **

## 2018-08-04 LAB
ANION GAP SERPL CALCULATED.3IONS-SCNC: 3 MMOL/L (ref 4–13)
BUN SERPL-MCNC: 35 MG/DL (ref 5–25)
CALCIUM SERPL-MCNC: 8.7 MG/DL (ref 8.3–10.1)
CHLORIDE SERPL-SCNC: 103 MMOL/L (ref 100–108)
CO2 SERPL-SCNC: 38 MMOL/L (ref 21–32)
CREAT SERPL-MCNC: 1.05 MG/DL (ref 0.6–1.3)
GFR SERPL CREATININE-BSD FRML MDRD: 59 ML/MIN/1.73SQ M
GLUCOSE SERPL-MCNC: 94 MG/DL (ref 65–140)
POTASSIUM SERPL-SCNC: 4.7 MMOL/L (ref 3.5–5.3)
SODIUM SERPL-SCNC: 144 MMOL/L (ref 136–145)

## 2018-08-04 PROCEDURE — 99232 SBSQ HOSP IP/OBS MODERATE 35: CPT | Performed by: INTERNAL MEDICINE

## 2018-08-04 PROCEDURE — 80048 BASIC METABOLIC PNL TOTAL CA: CPT | Performed by: INTERNAL MEDICINE

## 2018-08-04 RX ADMIN — CHLORHEXIDINE GLUCONATE 15 ML: 1.2 RINSE ORAL at 09:24

## 2018-08-04 RX ADMIN — HEPARIN SODIUM 5000 UNITS: 5000 INJECTION, SOLUTION INTRAVENOUS; SUBCUTANEOUS at 13:39

## 2018-08-04 RX ADMIN — LATANOPROST 1 DROP: 50 SOLUTION OPHTHALMIC at 09:25

## 2018-08-04 RX ADMIN — Medication 237 ML: at 17:38

## 2018-08-04 RX ADMIN — FLUTICASONE PROPIONATE 1 SPRAY: 50 SPRAY, METERED NASAL at 09:26

## 2018-08-04 RX ADMIN — ATORVASTATIN CALCIUM 40 MG: 40 TABLET, FILM COATED ORAL at 17:30

## 2018-08-04 RX ADMIN — TIMOLOL MALEATE 1 DROP: 2.5 SOLUTION OPHTHALMIC at 09:26

## 2018-08-04 RX ADMIN — FLUTICASONE PROPIONATE 1 SPRAY: 50 SPRAY, METERED NASAL at 17:30

## 2018-08-04 RX ADMIN — ACETAMINOPHEN 650 MG: 325 TABLET, FILM COATED ORAL at 21:03

## 2018-08-04 RX ADMIN — FUROSEMIDE 40 MG: 10 INJECTION, SOLUTION INTRAMUSCULAR; INTRAVENOUS at 09:24

## 2018-08-04 RX ADMIN — ASPIRIN 81 MG: 81 TABLET, COATED ORAL at 09:24

## 2018-08-04 RX ADMIN — Medication 237 ML: at 12:35

## 2018-08-04 RX ADMIN — CHLORHEXIDINE GLUCONATE 15 ML: 1.2 RINSE ORAL at 21:03

## 2018-08-04 RX ADMIN — Medication 325 MG: at 09:24

## 2018-08-04 RX ADMIN — HEPARIN SODIUM 5000 UNITS: 5000 INJECTION, SOLUTION INTRAVENOUS; SUBCUTANEOUS at 22:58

## 2018-08-04 RX ADMIN — Medication 325 MG: at 17:30

## 2018-08-04 RX ADMIN — FINASTERIDE 5 MG: 5 TABLET, FILM COATED ORAL at 09:24

## 2018-08-04 NOTE — PLAN OF CARE
METABOLIC, FLUID AND ELECTROLYTES - ADULT     Electrolytes maintained within normal limits Progressing     Fluid balance maintained Progressing        MUSCULOSKELETAL - ADULT     Maintain or return mobility to safest level of function Progressing        Nutrition/Hydration-ADULT     Nutrient/Hydration intake appropriate for improving, restoring or maintaining nutritional needs Progressing        Potential for Falls     Patient will remain free of falls Progressing        Prexisting or High Potential for Compromised Skin Integrity     Skin integrity is maintained or improved Progressing        RESPIRATORY - ADULT     Achieves optimal ventilation and oxygenation Progressing        SKIN/TISSUE INTEGRITY - ADULT     Skin integrity remains intact Progressing

## 2018-08-04 NOTE — PROGRESS NOTES
Progress Note - Charlotte Martell  80 y o  male MRN: [id number]    Unit/Bed#: ICU 09 Encounter: [id number]  Subjective:   Breathing better  Very concerned about his health  Denies chest pain  Denies much in the way of edema  No dizziness or palpitations    Objective:   Vitals: Blood pressure (!) 112/44, pulse 62, temperature 98 1 °F (36 7 °C), temperature source Temporal, resp  rate (!) 26, height 5' 5" (1 651 m), weight 65 9 kg (145 lb 4 5 oz), SpO2 97 %  ,Body mass index is 24 18 kg/m²  CMP:   Results from last 7 days  Lab Units 08/04/18  0424  08/02/18  1052   SODIUM mmol/L 144  < > 142   POTASSIUM mmol/L 4 7  < > 5 0   CHLORIDE mmol/L 103  < > 102   CO2 mmol/L 38*  < > 37*   ANION GAP mmol/L 3*  < > 3*   BUN mg/dL 35*  < > 29*   CREATININE mg/dL 1 05  < > 1 24   GLUCOSE RANDOM mg/dL 94  < > 114   CALCIUM mg/dL 8 7  < > 8 8   AST U/L  --   --  38   ALT U/L  --   --  41   ALK PHOS U/L  --   --  77   TOTAL PROTEIN g/dL  --   --  6 7   BILIRUBIN TOTAL mg/dL  --   --  0 51   EGFR ml/min/1 73sq m 59  < > 48   < > = values in this interval not displayed  Physical exam:  Lungs-decreased breath sounds especially at the bases bilaterally with a few rales there  No wheezing or rhonchi  Heart-regular, bradycardic with grade 1-2 systolic ejection murmur  Abdomen-nontender without mass organomegaly  Extremities-trace edema with absent distal pulses    Assessment:  1  Acute diastolic heart failure  Patient has hyperdynamic left ventricle with some right ventricular dilation  Appears to be improving with diuresis  2  Hypertension-fine off of medication  3  CAD with remote MI and possible stenting  Elevated troponin  Likely type 2 nontransmural myocardial infarction  Not a candidate for intervention  4  Hyperlipidemia-on statin  5    Ambulatory dysfunction-cause of admission    Plan:   Continue aspirin and statin  Continue IV diuresis  Hyperkalemia resolved off lisinopril which does not appear to be needed for blood pressure  Patient is okay for transfer to med surge telemetry  No beta-blocker in light of sinus bradycardia    Kishor Salcido MD

## 2018-08-04 NOTE — PROGRESS NOTES
[facility] Progress Note  Patient: My Ha  80 y o  male   MRN: [id number]  PCP: Orlando Yo MD  Unit/Bed#: ICU 09 Encounter: [id number]  Date Of Visit: 08/04/18    Assessment:  27-year-old male with a known history of coronary artery disease in distant inferior wall myocardial infarction who developed last 2 days increasing leg weakness inability ambulate and was found to be profoundly hypoxic by paramedics and on presentation to ED requiring BiPAP after pulse ox noted in the 60s    Initial troponin 0 17 and EKG noting ischemic T-wave changes in inferior and anterior lateral leads/initial blood gas with only minutes of BiPAP placed pH is 7 29 pCO2 68 with PO2 of 94 bicarb 32 chest x-ray shows only basilar atelectasis D-dimer of 717 in this age group considered within normal limits    Principal Problem:    Acute respiratory failure with hypoxia and hypercarbia (HCC)  Active Problems:    Sinus bradycardia    Essential hypertension    Pure hypercholesterolemia    Coronary artery disease involving native heart without angina pectoris    Non-ST elevation (NSTEMI) myocardial infarction (HCC)    Hyperkalemia      Plan:    · Acute respiratory failure with hypoxia and hypercarbia and actually was taken off BiPAP overnight now on nasal cannula/presume respiratory failure instigated by cardiac event and NSTEMI  ·  acute diastolic heart failure will continue IV Lasix  echocardiogram results note 75% EF with no wall motion abnormalities moderately dilated right atrium and right ventricle with systolic function of right ventricle normal  · Non ST elevation MI with serial troponins up 0 2 4 continue on aspirin and statin not a candidate for beta-blockade due to bradycardia by history not a candidate for intervention otherwise  · Hyperkalemia ACE-inhibitor discontinued monitor BMP  · Essential hypertension by history however remains normal tensive here and ACE-inhibitor discontinued in face of also hyperkalemia and NSTEMI  · Hyperlipidemia will continue on statin      VTE Pharmacologic Prophylaxis:   Pharmacologic: Heparin  Mechanical VTE Prophylaxis in Place: Yes    Discussions with Specialists or Other Care Team Provider:  Now    Time Spent for Care: 45 minutes  More than 50% of total time spent on counseling and coordination of care as described above  Subjective:   Significantly more awake today sitting on the bed no longer somnolent and was taken off BiPAP last night in favor nasal cannula he is able to relate level more of history then on and regional presentation stay in the was unable stand up on his feet for at least 2 weeks prior to admission but denied active chest pain preceding    Objective:     Vitals:   Temp (24hrs), Av 8 °F (37 1 °C), Min:98 1 °F (36 7 °C), Max:99 7 °F (37 6 °C)    HR:  [50-64] 62  Resp:  [17-32] 26  BP: (106-140)/(44-62) 112/44  SpO2:  [80 %-100 %] 97 %  Body mass index is 24 18 kg/m²  Input and Output Summary (last 24 hours):        Intake/Output Summary (Last 24 hours) at 18 1057  Last data filed at 18 0800   Gross per 24 hour   Intake              720 ml   Output             1800 ml   Net            -1080 ml       Physical Exam:     Physical Exam:   General appearance: delirious, fatigued, appears stated age and cooperative  Head: Normocephalic, without obvious abnormality, atraumatic  Lungs: diminished breath sounds  Heart: regular rate and rhythm  Abdomen: soft, non-tender; bowel sounds normal; no masses,  no organomegaly  Back: negative  Extremities: extremities normal, atraumatic, no cyanosis or edema  Neurologic: Mental status: Somnolent difficult to arouse hard of hearing      Additional Data:     Labs:      Results from last 7 days  Lab Units 18  0513   WBC Thousand/uL 7 01   HEMOGLOBIN g/dL 14 3   HEMATOCRIT % 48 9   PLATELETS Thousands/uL 179   NEUTROS PCT % 73   LYMPHS PCT % 17   MONOS PCT % 9   EOS PCT % 0       Results from last 7 days  Lab Units 08/04/18  0424  08/02/18  1052   SODIUM mmol/L 144  < > 142   POTASSIUM mmol/L 4 7  < > 5 0   CHLORIDE mmol/L 103  < > 102   CO2 mmol/L 38*  < > 37*   BUN mg/dL 35*  < > 29*   CREATININE mg/dL 1 05  < > 1 24   CALCIUM mg/dL 8 7  < > 8 8   TOTAL PROTEIN g/dL  --   --  6 7   BILIRUBIN TOTAL mg/dL  --   --  0 51   ALK PHOS U/L  --   --  77   ALT U/L  --   --  41   AST U/L  --   --  38   GLUCOSE RANDOM mg/dL 94  < > 114   < > = values in this interval not displayed  Results from last 7 days  Lab Units 08/02/18  1054   INR  1 04       * I Have Reviewed All Lab Data Listed Above  * Additional Pertinent Lab Tests Reviewed: All Labs For Current Hospital Admission Reviewed    Imaging:  Xr Chest 1 View Portable    Result Date: 8/2/2018  Narrative: CHEST INDICATION:   waek and hypoxic  COMPARISON:  7/11/2017 EXAM PERFORMED/VIEWS:  XR CHEST PORTABLE FINDINGS: Cardiomediastinal silhouette appears unremarkable  Bibasilar atelectasis is present  Lungs are otherwise clear  Osseous structures appear within normal limits for patient age  Impression: Bibasilar atelectasis  Workstation performed: [id number]     Imaging Reports Reviewed Today Include:  Chest x-ray reviewed  Imaging Personally Reviewed by Myself Includes:  No  Procedure: Xr Chest 1 View Portable    Result Date: 8/2/2018  Narrative: CHEST INDICATION:   waek and hypoxic  COMPARISON:  7/11/2017 EXAM PERFORMED/VIEWS:  XR CHEST PORTABLE FINDINGS: Cardiomediastinal silhouette appears unremarkable  Bibasilar atelectasis is present  Lungs are otherwise clear  Osseous structures appear within normal limits for patient age  Impression: Bibasilar atelectasis  Workstation performed: [id number]        Recent Cultures (last 7 days):       Results from last 7 days  Lab Units 08/02/18  1100 08/02/18  1053   BLOOD CULTURE  No Growth at 24 hrs  No Growth at 24 hrs         Last 24 Hours Medication List:     Current Facility-Administered Medications:  acetaminophen 650 mg Oral Q6H PRN Dolly Degroot MD   aspirin 81 mg Oral Daily Nam Mcdaniel MD   atorvastatin 40 mg Oral Daily With Lazarus Mora, MD   bisacodyl 5 mg Oral Daily PRN Dolly Degroot MD   chlorhexidine 15 mL Swish & Spit Q12H [address] Dolly Degroot MD   docusate sodium 100 mg Oral Daily PRN Dolly Degroot MD   ENSURE PLUS 1 Can Oral BID Dolly Degroot MD   ferrous sulfate 325 mg Oral BID Dolly Degroot MD   finasteride 5 mg Oral Daily Dolly Degroot MD   fluticasone 1 spray Nasal BID Dolly Degroot MD   furosemide 40 mg Intravenous Daily Dolly Degroot MD   heparin (porcine) 5,000 Units Subcutaneous [facility] Dolly Degroot MD   latanoprost 1 drop Left Eye Daily Dolly Degroot MD   pantoprazole 20 mg Oral Early Morning Dolly Degroot MD   timolol 1 drop Left Eye Daily Dolly Degroot MD        Today, Patient Was Seen By: Dolly Degroot MD    ** Please Note: Dragon 360 Dictation voice to text software may have been used in the creation of this document   **

## 2018-08-05 LAB
ANION GAP SERPL CALCULATED.3IONS-SCNC: -3 MMOL/L (ref 4–13)
BUN SERPL-MCNC: 27 MG/DL (ref 5–25)
CALCIUM SERPL-MCNC: 9.1 MG/DL (ref 8.3–10.1)
CHLORIDE SERPL-SCNC: 103 MMOL/L (ref 100–108)
CO2 SERPL-SCNC: 45 MMOL/L (ref 21–32)
CREAT SERPL-MCNC: 0.94 MG/DL (ref 0.6–1.3)
GFR SERPL CREATININE-BSD FRML MDRD: 67 ML/MIN/1.73SQ M
GLUCOSE SERPL-MCNC: 103 MG/DL (ref 65–140)
POTASSIUM SERPL-SCNC: 4.4 MMOL/L (ref 3.5–5.3)
SODIUM SERPL-SCNC: 145 MMOL/L (ref 136–145)

## 2018-08-05 PROCEDURE — 80048 BASIC METABOLIC PNL TOTAL CA: CPT | Performed by: INTERNAL MEDICINE

## 2018-08-05 PROCEDURE — 99232 SBSQ HOSP IP/OBS MODERATE 35: CPT | Performed by: INTERNAL MEDICINE

## 2018-08-05 RX ADMIN — PANTOPRAZOLE SODIUM 20 MG: 20 TABLET, DELAYED RELEASE ORAL at 05:22

## 2018-08-05 RX ADMIN — Medication 325 MG: at 17:29

## 2018-08-05 RX ADMIN — Medication 237 ML: at 09:30

## 2018-08-05 RX ADMIN — LATANOPROST 1 DROP: 50 SOLUTION OPHTHALMIC at 09:35

## 2018-08-05 RX ADMIN — ATORVASTATIN CALCIUM 40 MG: 40 TABLET, FILM COATED ORAL at 17:29

## 2018-08-05 RX ADMIN — TIMOLOL MALEATE 1 DROP: 2.5 SOLUTION OPHTHALMIC at 09:35

## 2018-08-05 RX ADMIN — CHLORHEXIDINE GLUCONATE 15 ML: 1.2 RINSE ORAL at 21:40

## 2018-08-05 RX ADMIN — HEPARIN SODIUM 5000 UNITS: 5000 INJECTION, SOLUTION INTRAVENOUS; SUBCUTANEOUS at 21:40

## 2018-08-05 RX ADMIN — ASPIRIN 81 MG: 81 TABLET, COATED ORAL at 09:34

## 2018-08-05 RX ADMIN — HEPARIN SODIUM 5000 UNITS: 5000 INJECTION, SOLUTION INTRAVENOUS; SUBCUTANEOUS at 14:11

## 2018-08-05 RX ADMIN — FINASTERIDE 5 MG: 5 TABLET, FILM COATED ORAL at 09:34

## 2018-08-05 RX ADMIN — FLUTICASONE PROPIONATE 1 SPRAY: 50 SPRAY, METERED NASAL at 17:29

## 2018-08-05 RX ADMIN — ACETAMINOPHEN 650 MG: 325 TABLET, FILM COATED ORAL at 21:41

## 2018-08-05 RX ADMIN — CHLORHEXIDINE GLUCONATE 15 ML: 1.2 RINSE ORAL at 09:34

## 2018-08-05 RX ADMIN — HEPARIN SODIUM 5000 UNITS: 5000 INJECTION, SOLUTION INTRAVENOUS; SUBCUTANEOUS at 05:22

## 2018-08-05 RX ADMIN — Medication 325 MG: at 09:34

## 2018-08-05 RX ADMIN — FLUTICASONE PROPIONATE 1 SPRAY: 50 SPRAY, METERED NASAL at 09:35

## 2018-08-05 RX ADMIN — FUROSEMIDE 40 MG: 10 INJECTION, SOLUTION INTRAMUSCULAR; INTRAVENOUS at 09:34

## 2018-08-05 NOTE — PROGRESS NOTES
[facility] Progress Note  Patient: Achille Severs  80 y o  male   MRN: [id number]  PCP: Lisa Orellana MD  Unit/Bed#: E4 -27 Encounter: [id number]  Date Of Visit: 08/05/18    Assessment:  44-year-old male with a known history of coronary artery disease in distant inferior wall myocardial infarction who developed last 2 days increasing leg weakness inability ambulate and was found to be profoundly hypoxic by paramedics and on presentation to ED requiring BiPAP after pulse ox noted in the 60s    Initial troponin 0 17 and EKG noting ischemic T-wave changes in inferior and anterior lateral leads/initial blood gas with only minutes of BiPAP placed pH is 7 29 pCO2 68 with PO2 of 94 bicarb 32 chest x-ray shows only basilar atelectasis D-dimer of 717 in this age group considered within normal limits    Principal Problem:    Acute respiratory failure with hypoxia and hypercarbia (HCC)  Active Problems:    Sinus bradycardia    Essential hypertension    Pure hypercholesterolemia    Coronary artery disease involving native heart without angina pectoris    Non-ST elevation (NSTEMI) myocardial infarction (HCC)    Hyperkalemia      Plan:    · Acute respiratory failure with hypoxia and hypercarbia and  was taken off BiPAP  now on nasal cannula at 6 L/presume respiratory failure instigated by cardiac event and NSTEMI  ·  acute diastolic heart failure will continue IV Lasix  echocardiogram results note 75% EF hyperdynamic left ventricle with no wall motion abnormalities moderately dilated right atrium and right ventricle with systolic function of right ventricle normal  · Non ST elevation MI with serial troponins up 0 2 4 continue on aspirin and statin not a candidate for beta-blockade due to bradycardia by history not a candidate for intervention otherwise  · Hyperkalemia ACE-inhibitor discontinued monitor BMP  · Essential hypertension by history however remains normal tensive here and ACE-inhibitor discontinued in face of also hyperkalemia and NSTEMI  · Hyperlipidemia will continue on statin  · Ambulatory dysfunction patient had been dependent on a scooter and or wheelchair to get around at home where he lives alone in the [place] with supply caregivers at least 4 hr daily as he is fully vested however appears to need more than this and will probably end up being oxygen dependent also need case management to define further disposition for home care      VTE Pharmacologic Prophylaxis:   Pharmacologic: Heparin  Mechanical VTE Prophylaxis in Place: Yes    Discussions with Specialists or Other Care Team Provider:  Now    Time Spent for Care: 45 minutes  More than 50% of total time spent on counseling and coordination of care as described above  Subjective:   Significantly more awake today sitting on the bed no longer somnolent and was taken off BiPAP last night in favor nasal cannula he is able to relate level more of history then on and regional presentation stay in the was unable stand up on his feet for at least 2 weeks prior to admission but also states he has been wheelchair dependent and uses a scooter and has caregiver supplied by the [place] at least 4 hr daily unclear whether he has any help    Objective:     Vitals:   Temp (24hrs), Av 7 °F (36 5 °C), Min:96 6 °F (35 9 °C), Max:98 5 °F (36 9 °C)    HR:  [54-64] 63  Resp:  [18-26] 18  BP: (112-151)/(43-66) 151/66  SpO2:  [95 %-99 %] 96 %  Body mass index is 24 18 kg/m²  Input and Output Summary (last 24 hours):        Intake/Output Summary (Last 24 hours) at 18 0905  Last data filed at 18 1646   Gross per 24 hour   Intake              900 ml   Output              200 ml   Net              700 ml       Physical Exam:     Physical Exam:   General appearance: delirious, fatigued, appears stated age and cooperative  Head: Normocephalic, without obvious abnormality, atraumatic  Lungs: diminished breath sounds crackles left base  Heart: regular rate and rhythm  Abdomen: soft, non-tender; bowel sounds normal; no masses,  no organomegaly  Back: negative  Extremities: extremities normal, atraumatic, no cyanosis or edema  Neurologic: Mental status: Somnolent difficult to arouse hard of hearing      Additional Data:     Labs:      Results from last 7 days  Lab Units 08/03/18  0513   WBC Thousand/uL 7 01   HEMOGLOBIN g/dL 14 3   HEMATOCRIT % 48 9   PLATELETS Thousands/uL 179   NEUTROS PCT % 73   LYMPHS PCT % 17   MONOS PCT % 9   EOS PCT % 0       Results from last 7 days  Lab Units 08/05/18  0530  08/02/18  1052   SODIUM mmol/L 145  < > 142   POTASSIUM mmol/L 4 4  < > 5 0   CHLORIDE mmol/L 103  < > 102   CO2 mmol/L 45*  < > 37*   BUN mg/dL 27*  < > 29*   CREATININE mg/dL 0 94  < > 1 24   CALCIUM mg/dL 9 1  < > 8 8   TOTAL PROTEIN g/dL  --   --  6 7   BILIRUBIN TOTAL mg/dL  --   --  0 51   ALK PHOS U/L  --   --  77   ALT U/L  --   --  41   AST U/L  --   --  38   GLUCOSE RANDOM mg/dL 103  < > 114   < > = values in this interval not displayed  Results from last 7 days  Lab Units 08/02/18  1054   INR  1 04       * I Have Reviewed All Lab Data Listed Above  * Additional Pertinent Lab Tests Reviewed: All Labs For Current Hospital Admission Reviewed    Imaging:  Xr Chest 1 View Portable    Result Date: 8/2/2018  Narrative: CHEST INDICATION:   waek and hypoxic  COMPARISON:  7/11/2017 EXAM PERFORMED/VIEWS:  XR CHEST PORTABLE FINDINGS: Cardiomediastinal silhouette appears unremarkable  Bibasilar atelectasis is present  Lungs are otherwise clear  Osseous structures appear within normal limits for patient age  Impression: Bibasilar atelectasis  Workstation performed: [id number]     Imaging Reports Reviewed Today Include:  Chest x-ray reviewed  Imaging Personally Reviewed by Myself Includes:  No  Procedure: Xr Chest 1 View Portable    Result Date: 8/2/2018  Narrative: CHEST INDICATION:   waek and hypoxic   COMPARISON:  7/11/2017 EXAM PERFORMED/VIEWS:  XR CHEST PORTABLE FINDINGS: Cardiomediastinal silhouette appears unremarkable  Bibasilar atelectasis is present  Lungs are otherwise clear  Osseous structures appear within normal limits for patient age  Impression: Bibasilar atelectasis  Workstation performed: [id number]        Recent Cultures (last 7 days):       Results from last 7 days  Lab Units 08/02/18  1100 08/02/18  1053   BLOOD CULTURE  No Growth at 48 hrs  No Growth at 48 hrs  Last 24 Hours Medication List:     Current Facility-Administered Medications:  acetaminophen 650 mg Oral Q6H PRN Tesfaye Farris MD   aspirin 81 mg Oral Daily Martha Gibson MD   atorvastatin 40 mg Oral Daily With Emily Hartman MD   bisacodyl 5 mg Oral Daily PRN Tesfaye Farris MD   chlorhexidine 15 mL Swish & Spit Q12H [facility] Tesfaye Farris MD   docusate sodium 100 mg Oral Daily PRN Tesfaye Farris MD   ENSURE PLUS 1 Can Oral BID Tesfaye Farris MD   ferrous sulfate 325 mg Oral BID Tesfaye Farris MD   finasteride 5 mg Oral Daily Tesfaye Farris MD   fluticasone 1 spray Nasal BID Tesfaye Farris MD   furosemide 40 mg Intravenous Daily Tesfaye Farris MD   heparin (porcine) 5,000 Units Subcutaneous [facility] Tesfaye Farris MD   latanoprost 1 drop Left Eye Daily Tesfaye Farris MD   pantoprazole 20 mg Oral Early Morning Tesfaye Farris MD   timolol 1 drop Left Eye Daily Tesfaye Farris MD        Today, Patient Was Seen By: Tesfaye Farris MD    ** Please Note: Dragon 360 Dictation voice to text software may have been used in the creation of this document   **

## 2018-08-05 NOTE — SOCIAL WORK
CM attempted to meet with patient, however, he was confused  Weekend covering CM called g-daughter, Anastacia Gao, to do SW assessment  Patient lives alone in a 1st floor apartment; no GRECIA  Patient does have difficulty with steps  PTA, patient was primarily independent with ADLs and functional mobility  He does have HHA 2 hours in the morning/1 hour in the evening to be assured he is safe dressing/showering, or making him breakfast if he chooses to not go to [employer] for breakfast, which he does almost everyday; he is picked up by the [employer] bus daily for that  HHA is provided by [employer] [facility] [address] [place] [address]  Food shopping is done by g-daughter & g-daughter provides dinner by either dropping meals off, taking him out to eat or bringing him to her home, which has decreased because of the stairs in her bi-level home  Patient uses a RW in his apartment & an electric scooter out in the community for ambulation purposes  He uses his scooter to drive to [facility], across the street from his home, to go to the Pub to eat, as he so desires  PTA, patient was able to ambulate without assistance from a seated or laying position  He also has an occupational therapists through [place] as well  No hx of VNA reported  Patient is involved with the [place]; his CM is Jaswinder Baugh (g-daughter unsure if spelling is accurate)  Patient is retired  PCP identified as Dr Citlaly Figueroa with the [place]  POA is identified as g-daughter, as well as, spokesperson for the family and designated caregiver if/when needed  [facility]  Patient does not drive, except his electric scooter; g-daughter reports she is available at discharge to transport home  Patient has help/support when he gets home  Outside of ER visits, patient does not have any hospital admissions  CM will continue to follow for any further anticipated discharge needs

## 2018-08-05 NOTE — PLAN OF CARE

## 2018-08-06 PROBLEM — N40.0 BPH (BENIGN PROSTATIC HYPERPLASIA): Status: ACTIVE | Noted: 2018-08-06

## 2018-08-06 PROBLEM — R77.8 TROPONIN I ABOVE REFERENCE RANGE: Status: ACTIVE | Noted: 2018-08-06

## 2018-08-06 PROBLEM — E87.5 HYPERKALEMIA: Status: RESOLVED | Noted: 2018-08-03 | Resolved: 2018-08-06

## 2018-08-06 PROBLEM — R26.2 AMBULATORY DYSFUNCTION: Status: ACTIVE | Noted: 2018-08-06

## 2018-08-06 LAB
BUN SERPL-MCNC: 26 MG/DL (ref 5–25)
CALCIUM SERPL-MCNC: 8.8 MG/DL (ref 8.3–10.1)
CHLORIDE SERPL-SCNC: 101 MMOL/L (ref 100–108)
CO2 SERPL-SCNC: >45 MMOL/L (ref 21–32)
CREAT SERPL-MCNC: 0.82 MG/DL (ref 0.6–1.3)
GFR SERPL CREATININE-BSD FRML MDRD: 74 ML/MIN/1.73SQ M
GLUCOSE SERPL-MCNC: 80 MG/DL (ref 65–140)
POTASSIUM SERPL-SCNC: 4.8 MMOL/L (ref 3.5–5.3)
SODIUM SERPL-SCNC: 145 MMOL/L (ref 136–145)

## 2018-08-06 PROCEDURE — 99232 SBSQ HOSP IP/OBS MODERATE 35: CPT | Performed by: INTERNAL MEDICINE

## 2018-08-06 PROCEDURE — G8978 MOBILITY CURRENT STATUS: HCPCS

## 2018-08-06 PROCEDURE — 97163 PT EVAL HIGH COMPLEX 45 MIN: CPT

## 2018-08-06 PROCEDURE — G8979 MOBILITY GOAL STATUS: HCPCS

## 2018-08-06 PROCEDURE — 80048 BASIC METABOLIC PNL TOTAL CA: CPT | Performed by: INTERNAL MEDICINE

## 2018-08-06 RX ADMIN — CHLORHEXIDINE GLUCONATE 15 ML: 1.2 RINSE ORAL at 08:54

## 2018-08-06 RX ADMIN — ASPIRIN 81 MG: 81 TABLET, COATED ORAL at 08:54

## 2018-08-06 RX ADMIN — FINASTERIDE 5 MG: 5 TABLET, FILM COATED ORAL at 08:54

## 2018-08-06 RX ADMIN — FLUTICASONE PROPIONATE 1 SPRAY: 50 SPRAY, METERED NASAL at 18:11

## 2018-08-06 RX ADMIN — LATANOPROST 1 DROP: 50 SOLUTION OPHTHALMIC at 08:55

## 2018-08-06 RX ADMIN — PANTOPRAZOLE SODIUM 20 MG: 20 TABLET, DELAYED RELEASE ORAL at 05:57

## 2018-08-06 RX ADMIN — HEPARIN SODIUM 5000 UNITS: 5000 INJECTION, SOLUTION INTRAVENOUS; SUBCUTANEOUS at 05:57

## 2018-08-06 RX ADMIN — ATORVASTATIN CALCIUM 40 MG: 40 TABLET, FILM COATED ORAL at 18:06

## 2018-08-06 RX ADMIN — HEPARIN SODIUM 5000 UNITS: 5000 INJECTION, SOLUTION INTRAVENOUS; SUBCUTANEOUS at 14:00

## 2018-08-06 RX ADMIN — Medication 325 MG: at 18:06

## 2018-08-06 RX ADMIN — CHLORHEXIDINE GLUCONATE 15 ML: 1.2 RINSE ORAL at 21:09

## 2018-08-06 RX ADMIN — HEPARIN SODIUM 5000 UNITS: 5000 INJECTION, SOLUTION INTRAVENOUS; SUBCUTANEOUS at 21:08

## 2018-08-06 RX ADMIN — FLUTICASONE PROPIONATE 1 SPRAY: 50 SPRAY, METERED NASAL at 08:55

## 2018-08-06 RX ADMIN — TIMOLOL MALEATE 1 DROP: 2.5 SOLUTION OPHTHALMIC at 08:55

## 2018-08-06 RX ADMIN — Medication 325 MG: at 08:54

## 2018-08-06 RX ADMIN — FUROSEMIDE 40 MG: 10 INJECTION, SOLUTION INTRAMUSCULAR; INTRAVENOUS at 08:54

## 2018-08-06 NOTE — SOCIAL WORK
Talked with Kuldip fabian over the phone about PT recommenation  She is agreeable to short stay at SNF in this order: [address], [facility] and [place]  Met with pt to discuss rehab and he is also agreeable  CM will f/u with Gutierrez Fonseca from the [place]

## 2018-08-06 NOTE — PLAN OF CARE
Problem: PHYSICAL THERAPY ADULT  Goal: Performs mobility at highest level of function for planned discharge setting  See evaluation for individualized goals  Treatment/Interventions: Functional transfer training, LE strengthening/ROM, Therapeutic exercise, Endurance training, Patient/family training, Bed mobility, Gait training, Spoke to nursing  Equipment Recommended: David Letters       See flowsheet documentation for full assessment, interventions and recommendations  Outcome: Progressing  Prognosis: Fair  Problem List: Decreased strength, Decreased endurance, Impaired balance, Decreased mobility, Decreased cognition, Impaired judgement, Decreased safety awareness, Impaired hearing  Assessment: Pt  89 y o male admitted for Acute respiratory failure with hypoxia and hypercarbia (HCC) requiring BiPAP upon admission w/ Acute heart failure, BLE Weakness, sinus bardycardia, NSTEMI w/ h/o Coronary artery disease involving native heart without angina pectoris, unspecified vessel or lesion type  Pt now on 6li O2 via NC  Pt referred to PT for mobility assessment & D/C planning w/ orders of early mobilization protocol  Of note, unable to gather PLOF & home set up info from pt as pt very irritable & gets easily irritated when asked questions & refused to answer any questions as well hence all PLOF & social hx gathered from [facility] notes  Per CM notes, pt able to ambulate w/ RW household distance & uses electric scooter for community distances at baseline; pt home alone & has home health aide 2 hrs in am & 1 hr in pm daily  On eval, pt demonstrate dec mobility, balance, endurance & amb 2* to deconditioning  Pt require modAx1 for most functional mobility + cues for techniques  Pt demonstrates poor safety techniques during mobility but pt either unable to hear or follow or refused to follow commands to correct his unsafe techniques  Pt gets irritated when corrected & continues to move along w/ his unsafe techniques   Gait deviations as above, slow w/ dec foot clearance, forward flexion posture & unsteady but no gross LOB noted  Retropulsive during initial sit to stand but pt able to correct w/ tactile cues  No dizziness reported t/o session  Noted SpO2 at 77% w/ 6li O2 but no apparent & no complaints of SOB t/o session  Unable to get further O2 sat reading during & after mobility as pt refused to use pulse ox  Nsg staff most recent vital signs as follows: /93 (BP Location: Left arm)   Pulse 60   Temp 98 3 °F (36 8 °C) (Temporal)   Resp 18   Ht 5' 5" (1 651 m)   Wt 65 9 kg (145 lb 4 5 oz)   SpO2 100%   BMI 24 18 kg/m²   Pt severely [place] & irritable t/o session  Pt yells, "What else do you want?!!" when asked any further questions or when asked to do anything else for further assessment  At end of session, pt remain OOB in chair w/o issues, call bell & phone in reach, chair alarm activated  Will continue skilled PT to improve function & safety  At this time, due to above mentioned deficits, high risk for falls, home alone & advanced age, pt will benefit from inpt rehab at D/C   to follow  Nsg staff to continue to mobilized pt (OOB in chair for all meals & ambulate in room/unit) as tolerated to prevent further decline in function  Nsg notified  Barriers to Discharge: Decreased caregiver support  Barriers to Discharge Comments: pt home alone  Recommendation: Short-term skilled PT     PT - OK to Discharge: Yes (to inpt rehab)    See flowsheet documentation for full assessment

## 2018-08-06 NOTE — PHYSICAL THERAPY NOTE
PT EVALUATION    Pt  Name: Mello Host  Age: 80 y o  MRN: [id number]    Patient Active Problem List   Diagnosis    Sinus bradycardia    Essential hypertension    Pure hypercholesterolemia    Coronary artery disease involving native heart without angina pectoris    Non-ST elevation (NSTEMI) myocardial infarction ([address] )    Acute respiratory failure with hypoxia (HCC)    Acute respiratory failure with hypoxia and hypercarbia (HCC)    Hyperkalemia       Admitting Diagnoses:   Acute heart failure (HCC) [I50 9]  Weakness [R53 1]  Hypoxia [R09 02]  Acute combined systolic and diastolic congestive heart failure (HCC) [I50 41]  Coronary artery disease involving native heart without angina pectoris, unspecified vessel or lesion type [I25 10]    Past Medical History:   Diagnosis Date    Anemia     Cardiac disease     GERD (gastroesophageal reflux disease)     History of BPH        History reviewed  No pertinent surgical history  08/06/18 1046   Note Type   Note type Eval only   Pain Assessment   Pain Score No Pain   Home Living   Type of Home Apartment   Home Layout One level   Home Equipment Walker;Electric scooter   Additional Comments info gathered from CM notes   Prior Function   Level of [place] Other (Comment)  (please refer in the comments section below )   [facility] in the last 6 months (unknown, pt refused to answer questions)   Comments pt irritable & [place], pt refusing to answer questions; info gathered from CM notes; per CM notes, pt ambulates w/ RW for household distance & electric scooter for community distances   Restrictions/Precautions   Weight Bearing Precautions Per Order No   Other Precautions Telemetry;O2;Fall Risk;Hard of hearing; Chair Alarm; Bed Alarm   General   Family/Caregiver Present No   Cognition   Overall Cognitive Status Unable to assess  (pt [place] & gets very irritated when asked questions) Arousal/Participation Alert   Orientation Level Unable to assess  (pt [place] & gets very irritated when asked questions)   Following Commands Follows one step commands with increased time or repetition   Comments pt very irritable & not engaging   RUE Assessment   RUE Assessment WFL   RUE Strength   RUE Overall Strength Within Functional Limits - able to perform ADL tasks with strength   LUE Assessment   LUE Assessment WFL   LUE Strength   LUE Overall Strength Within Functional Limits - able to perform ADL tasks with strength   RLE Assessment   RLE Assessment WFL   Strength RLE   RLE Overall Strength 4/5   LLE Assessment   LLE Assessment WFL   Strength LLE   LLE Overall Strength 4/5   Bed Mobility   Supine to Sit Unable to assess   Additional Comments pt OOB in chair pre & post session   Transfers   Sit to Stand 3  Moderate assistance   Additional items Assist x 1; Armrests; Increased time required;Verbal cues   Stand to Sit 3  Moderate assistance   Additional items Assist x 1; Armrests; Increased time required;Verbal cues   Stand pivot 4  Minimal assistance   Additional items Assist x 1;Bedrails; Increased time required;Verbal cues   Additional Comments cues for techniques & safety; pt performs unsafe techniques & gets irritated when corrected & refused to follow commands   Ambulation/Elevation   Gait pattern Forward Flexion; Wide VENTURA; Decreased foot clearance; Short stride; Step to;Excessively slow   Gait Assistance 3  Moderate assist   Additional items Assist x 1;Verbal cues; Tactile cues   Assistive Device Rolling walker   Distance 15'x2   Balance   Static Sitting Fair   Static Standing Poor +   Ambulatory Poor   Activity Tolerance   Activity Tolerance Patient limited by fatigue;Treatment limited secondary to agitation   Nurse Made Aware Delia   Assessment   Prognosis Fair   Problem List Decreased strength;Decreased endurance; Impaired balance;Decreased mobility; Decreased cognition; Impaired judgement;Decreased safety awareness; Impaired hearing   Assessment Pt  80 y o male admitted for Acute respiratory failure with hypoxia and hypercarbia (HCC) requiring BiPAP upon admission w/ Acute heart failure, BLE Weakness, sinus bardycardia, NSTEMI w/ h/o Coronary artery disease involving native heart without angina pectoris, unspecified vessel or lesion type  Pt now on 6li O2 via [place]  Pt referred to PT for mobility assessment & D/C planning w/ orders of early mobilization protocol  Of note, unable to gather PLOF & home set up info from pt as pt very irritable & gets easily irritated when asked questions & refused to answer any questions as well hence all PLOF & social hx gathered from [facility] notes  Per CM notes, pt able to ambulate w/ RW household distance & uses electric scooter for community distances at baseline; pt home alone & has home health aide 2 hrs in am & 1 hr in pm daily  On eval, pt demonstrate dec mobility, balance, endurance & amb 2* to deconditioning  Pt require modAx1 for most functional mobility + cues for techniques  Pt demonstrates poor safety techniques during mobility but pt either unable to hear or follow or refused to follow commands to correct his unsafe techniques  Pt gets irritated when corrected & continues to move along w/ his unsafe techniques  Gait deviations as above, slow w/ dec foot clearance, forward flexion posture & unsteady but no gross LOB noted  Retropulsive during initial sit to stand but pt able to correct w/ tactile cues  No dizziness reported t/o session  Noted SpO2 at 77% w/ 6li O2 but no apparent & no complaints of SOB t/o session  Unable to get further O2 sat reading during & after mobility as pt refused to use pulse ox  Nsg staff most recent vital signs as follows: /93 (BP Location: Left arm)   Pulse 60   Temp 98 3 °F (36 8 °C) (Temporal)   Resp 18   Ht 5' 5" (1 651 m)   Wt 65 9 kg (145 lb 4 5 oz)   SpO2 100%   BMI 24 18 kg/m²   Pt severely [place] & irritable t/o session   Pt nuha "What else do you want?!!" when asked any further questions or when asked to do anything else for further assessment  At end of session, pt remain OOB in chair w/o issues, call bell & phone in reach, chair alarm activated  Will continue skilled PT to improve function & safety  At this time, due to above mentioned deficits, high risk for falls, home alone & advanced age, pt will benefit from inpt rehab at D/C  CM to follow  Ns staff to continue to mobilized pt (OOB in chair for all meals & ambulate in room/unit) as tolerated to prevent further decline in function  Nsg notified  Barriers to Discharge Decreased caregiver support   Barriers to Discharge Comments pt home alone   Goals   Patient Goals unable to asked, as pt gets easily irritated when asked questions   STG Expiration Date 08/16/18   Short Term Goal #1 Goals to be met in 10 days; pt will be able to: 1) inc strength & balance by 1/2 grade to improve overall functional mobility & dec fall risk; 2) inc bed mobility to S for pt to be able to get in/OOB safely w/o % of the time & to dec caregiver assistance; 3) inc transfers to S for pt to transition safely from one surface to another w/o % of the time & dec caregiver assistance; 4) inc amb w/ RW approx  150'  w/ S for pt to ambulate as tolerated w/o any % of the time & dec caregiver assistance; 4) inc barthel score to 70 to decrease overall risk for falls; 5) pt/caregiver ed   Treatment Day 0   Plan   Treatment/Interventions Functional transfer training;LE strengthening/ROM; Therapeutic exercise; Endurance training;Patient/family training;Bed mobility;Gait training;Spoke to nursing   PT Frequency Other (Comment)  (4-5x/wk)   Recommendation   Recommendation Short-term skilled PT   Equipment Recommended Walker   PT - OK to Discharge Yes  (to inpt rehab)   Barthel Index   Feeding 10   Bathing 0   Grooming Score 5   Dressing Score 5   Bladder Score 5   Bowels Score 10   Toilet Use Score 5 Transfers (Bed/Chair) Score 10   Mobility (Level Surface) Score 0   Stairs Score 0   Barthel Index Score 50   Hx/personal factors: co-morbidities, home alone, advanced age, telemetry, use of AD, dec cognition and bed/chair alarm  Examination: dec mobility, dec balance, dec endurance, dec amb, moderate fall risk, dec cognition  Clinical: unpredictable (ongoing medical status, abnormal lab values and moderate fall risk)  Complexity: high     Sheldon Grout, PT

## 2018-08-06 NOTE — SOCIAL WORK
PT is now recommending a short stay rehab  Pt has listed for his [place]  The following SNF have a contact with the [facility] and referrals were made to [facility], [facility] and [place]  This was not discuss with pt/family yet  Left a message with RN ([phone] at [facility] to assist with finding other short stay facilites

## 2018-08-06 NOTE — MALNUTRITION/BMI
This medical record reflects one or more clinical indicators suggestive of malnutrition and/or morbid obesity  Malnutrition Findings:   Malnutrition type: Chronic illness (pt meeting criteria for chronic severe pro, ana malnutrition d/t condition, advanced age as evidence by severe muscle loss in clavicles and moderate fat loss in orbitals treated w/ supplements that provide additional 40g pro, 700cal )  Degree of Malnutrition: Other severe protein calorie malnutrition  Malnutrition Characteristics: Muscle loss, Fat loss    BMI Findings: Body mass index is 24 18 kg/m²  See Nutrition note dated 8/6/18 for additional details  Completed nutrition assessment is viewable in the nutrition documentation

## 2018-08-06 NOTE — SOCIAL WORK
TC to RN at / [facility]  that stated if pt is to go to SNF (need to [facility] with [facility])  need to call Asia Garza at [phone] ext [phone]  He would need to approve the short stay and review clinical  Alex Henriquez feels pt would not be agreeable to SNF  If pt refuses SNF and would be agreeable to VNA, then Alex Henriquez will use the [place] PT/OT  Alex eHnriquez requesting update   CM will f/u

## 2018-08-06 NOTE — PROGRESS NOTES
Progress Note - Cardiology   Maggie See  80 y o  male MRN: [id number]  Unit/Bed#: E4 -01 Encounter: [id number]        Asessment/Plan:  1  Acute hypoxic and hypercarbic respiratory failure:  Secondary CHF  2  Chronic and acute diastolic CHF:  Improved  3  Non STEMI type 2:  Secondary to above problems  4  CAD, remote inferior MI  5  Sinus bradycardia:  Seemingly asymptomatic  6  Hypertension:  Controlled  7  Dyslipidemia:  Remains on statin  8  Ambulatory dysfunction:  Physical therapy no reviewed with recommendation discharge to inpatient rehab facility  · Echocardiogram of this admission shows hyperdynamic LVEF with no regional wall motion abnormality and only mild valve disease  · Despite history of CAD the patient is on no beta-blocker because of bradycardia  Continue ASA 81 mg  He remains on a statin though the benefit in his age group is arguable ~~> continue same for now deferring to his primary cardiologist  · No indication for pacemaker as the patient seems asymptomatic with no AV block  · Examination suggests improvement in volume overload  Pulmonary examination still sounds congested with signs of upper airway secretions ~~> wonder if there is a noncardiogenic component to this - possibly infectious (doubtful) vs  aspirate or other  Continue IV diuretic, repeat proBNP, continue to follow clinical course      Subjective:  Offers no specific complaint at the time my visit  He reports that he is voiding well    He report limited ambulation    Vitals:  Vitals:    08/02/18 1035   Weight: 65 9 kg (145 lb 4 5 oz)   ,  Vitals:    08/05/18 1241 08/05/18 1535 08/05/18 2345 08/06/18 0744   BP:  119/59 124/68 161/93   BP Location:  Left arm Left arm Left arm   Pulse:  78 92 60   Resp:  18 18 18   Temp:  98 1 °F (36 7 °C) 98 5 °F (36 9 °C) 98 3 °F (36 8 °C)   TempSrc:  Tympanic Temporal Temporal   SpO2: 99% 97% 95% 100%   Weight:       Height:           Exam:  General:  Appears comfortable seated in chair out of bed  He is alert and appropriately conversant  Heart:  Regular with controlled rate and no gross murmur  Respiratory effort:  Mild to moderate excursion air exchange  Lungs:  Some coarse upper airway breath sounds/secretions    Trace rales  Lower Limbs:  Improve with some mild edema of the distal pretibial and malleolar areas    Medications:    Current Facility-Administered Medications:     acetaminophen (TYLENOL) tablet 650 mg, 650 mg, Oral, Q6H PRN, Charla Pimentel MD, 650 mg at 08/05/18 2141    aspirin (ECOTRIN LOW STRENGTH) EC tablet 81 mg, 81 mg, Oral, Daily, Aleks Hassan MD, 81 mg at 08/06/18 0854    atorvastatin (LIPITOR) tablet 40 mg, 40 mg, Oral, Daily With Armida Reyes MD, 40 mg at 08/05/18 1729    bisacodyl (DULCOLAX) EC tablet 5 mg, 5 mg, Oral, Daily PRN, Charla Pimentel MD    chlorhexidine (PERIDEX) 0 12 % oral rinse 15 mL, 15 mL, Swish & Spit, Q12H [address], Charla Pimentel MD, 15 mL at 08/06/18 0854    docusate sodium (COLACE) capsule 100 mg, 100 mg, Oral, Daily PRN, Charla Pimentel MD    ENSURE PLUS LIQD 237 mL, 1 Can, Oral, BID, Charla Pimentel MD, 237 mL at 08/05/18 0930    ferrous sulfate tablet 325 mg, 325 mg, Oral, BID, Charla Pimentel MD, 325 mg at 08/06/18 0854    finasteride (PROSCAR) tablet 5 mg, 5 mg, Oral, Daily, Charla Pimentel MD, 5 mg at 08/06/18 0854    fluticasone (FLONASE) 50 mcg/act nasal spray 1 spray, 1 spray, Nasal, BID, Charla Pimentel MD, 1 spray at 08/06/18 0855    furosemide (LASIX) injection 40 mg, 40 mg, Intravenous, Daily, Charla Pimentel MD, 40 mg at 08/06/18 0854    heparin (porcine) subcutaneous injection 5,000 Units, 5,000 Units, Subcutaneous, Q8H [address], 5,000 Units at 08/06/18 0557 **AND** Platelet count, , , Once, Charla Pimentel MD    latanoprost (XALATAN) 0 005 % ophthalmic solution 1 drop, 1 drop, Left Eye, Daily, Charla Pimentel MD, 1 drop at 08/06/18 0855    pantoprazole (PROTONIX) EC tablet 20 mg, 20 mg, Oral, Early Morning, Socrates Stanley MD, 20 mg at 08/06/18 0557    timolol (TIMOPTIC) 0 25 % ophthalmic solution 1 drop, 1 drop, Left Eye, Daily, Socrates Stanley MD, 1 drop at 08/06/18 0855      Labs/Data:          Results from last 7 days  Lab Units 08/03/18  0513 08/02/18  1647 08/02/18  1050   WBC Thousand/uL 7 01  --  8 12   HEMOGLOBIN g/dL 14 3  --  14 4   HEMATOCRIT % 48 9  --  48 7   PLATELETS Thousands/uL 179 195 243     Results from last 7 days  Lab Units 08/06/18  0600 08/05/18  0530 08/04/18  0424  08/02/18  1949 08/02/18  1647 08/02/18  1303   SODIUM mmol/L 145 145 144  < >  --   --   --    POTASSIUM mmol/L 4 8 4 4 4 7  < >  --   --   --    CHLORIDE mmol/L 101 103 103  < >  --   --   --    CO2 mmol/L >45* 45* 38*  < >  --   --   --    BUN mg/dL 26* 27* 35*  < >  --   --   --    TROPONIN I ng/mL  --   --   --   --  0 24* 0 18* 0 17*   < > = values in this interval not displayed

## 2018-08-06 NOTE — CASE MANAGEMENT
Continued Stay Review    Date:  8/6 2018    Vital Signs: /67 (BP Location: Left arm)   Pulse 63   Temp 97 8 °F (36 6 °C) (Temporal)   Resp 18   Ht 5' 5" (1 651 m)   Wt 65 9 kg (145 lb 4 5 oz)   SpO2 98%   BMI 24 18 kg/m²   On O2 @ 6 liters  NC    Medications:   Scheduled Meds:   Current Facility-Administered Medications:         aspirin 81 mg Oral Daily    atorvastatin 40 mg Oral Daily With Dinner           chlorhexidine 15 mL Swish & Spit Q12H [address]           ENSURE PLUS 1 Can Oral BID    ferrous sulfate 325 mg Oral BID    finasteride 5 mg Oral Daily    fluticasone 1 spray Nasal BID    furosemide 40 mg Intravenous Daily    heparin (porcine) 5,000 Units Subcutaneous Q8H [address]    latanoprost 1 drop Left Eye Daily    pantoprazole 20 mg Oral Early Morning    timolol 1 drop Left Eye Daily      Continuous Infusions:    PRN Meds:   acetaminophen    bisacodyl    docusate sodium    Abnormal Labs/Diagnostic Results:  CO2  > 45,    BUN 26    Age/Sex: 80 y o  male     Assessment/Plan:   Per ATTENDING 8/5:   · Acute respiratory failure with hypoxia and hypercarbia and  was taken off BiPAP  now on nasal cannula at 6 L/presume respiratory failure instigated by cardiac event and NSTEMI  ·  acute diastolic heart failure will continue IV Lasix  echocardiogram results note 75% EF hyperdynamic left ventricle with no wall motion abnormalities moderately dilated right atrium and right ventricle with systolic function of right ventricle normal  · Non ST elevation MI with serial troponins up 0 2 4 continue on aspirin and statin not a candidate for beta-blockade due to bradycardia by history not a candidate for intervention otherwise  · Hyperkalemia ACE-inhibitor discontinued monitor BMP  · Essential hypertension by history however remains normal tensive here and ACE-inhibitor discontinued in face of also hyperkalemia and NSTEMI  · Hyperlipidemia will continue on statin  · Ambulatory dysfunction patient had been dependent on a scooter and or wheelchair to get around at home where he lives alone in the [place] with supply caregivers at least 4 hr daily as he is fully vested however appears to need more than this and will probably end up being oxygen dependent also need case management to define further disposition for home care     Per CARDIO  8/6:   1  Acute hypoxic and hypercarbic respiratory failure:  Secondary CHF  2  Chronic and acute diastolic CHF:  Improved  3  Non STEMI type 2:  Secondary to above problems  4  CAD, remote inferior MI  5  Sinus bradycardia:  Seemingly asymptomatic  6  Hypertension:  Controlled  7  Dyslipidemia:  Remains on statin  8  Ambulatory dysfunction:  Physical therapy no reviewed with recommendation discharge to inpatient rehab facility      · Echocardiogram of this admission shows hyperdynamic LVEF with no regional wall motion abnormality and only mild valve disease  · Despite history of CAD the patient is on no beta-blocker because of bradycardia  Continue ASA 81 mg  He remains on a statin though the benefit in his age group is arguable ~~> continue same for now deferring to his primary cardiologist  · No indication for pacemaker as the patient seems asymptomatic with no AV block  · Examination suggests improvement in volume overload  Pulmonary examination still sounds congested with signs of upper airway secretions ~~> wonder if there is a noncardiogenic component to this - possibly infectious (doubtful) vs  aspirate or other  Continue IV diuretic, repeat proBNP, continue to follow clinical course    Discharge Plan: PT recommending ST Rehab      Thank you,  Taqueria Pendleton  [facility] Utilization Review Department  Phone: [phone]; Fax [phone]  ATTENTION: The Network Utilization Review Department is now centralized for our 9 Facilities  Make a note that we have a new phone and fax numbers for our Department   Please call with any questions or concerns to [phone] and carefully follow the prompts so that you are directed to the right person  All voicemails are confidential  Fax any determinations, approvals, denials, and requests for initial or continue stay review clinical to [phone]  Due to HIGH CALL volume, it would be easier if you could please send faxed requests to expedite your requests and in part, help us provide discharge notifications faster

## 2018-08-06 NOTE — PROGRESS NOTES
Progress Note - Mirlande Gracia  80 y o  male MRN: [id number]    Unit/Bed#: E4 -01 Encounter: [id number]      Assessment/Plan:  1-acute/chronic diastolic congestive heart failure exacerbation:  Continue IV diuresis  -currently on Lasix 40 mg IV daily   -monitor daily weights and creatinine   -echocardiogram from 08/03 with left ventricular ejection fraction 75%  No regional wall motion abnormalities  Wall thickness mildly-moderately increased  Right ventricle mildly-moderately dilated  2-essential hypertension:  Blood pressure currently adequately controlled, but not at goal   Most recent blood pressure was 153/67  However his blood pressures yesterday ranged 119-124 in the evening      -Continue to monitor on current regimen    -Continue to hold ACE-inhibitor due to prior hyperkalemia      -If patient requires additional agent, cardiology recommends Norvasc  3-hyperlipidemia:  Continue statin    4-hyperkalemia:  ACE-inhibitor on hold  Hyperkalemia resolved  5-coronary artery disease: With history of previous MRI  Patient presented with chest pain and a positive troponin  He was evaluated by the cardiology service  His positive troponin was felt to be most likely secondary to a non STEMI type 2, secondary to his congestive heart failure  Further ischemic testing was not recommended by Cardiology at this time    -continue medical therapy with aspirin, statin    -no beta-blocker due to bradycardia    6-acute hypoxic/hypercapnic respiratory failure:  Patient presented to the ER in respiratory distress with hypoxia/hypercapnia  He initially was admitted to the step-down unit and required BiPAP  -continue to monitor and titrate off oxygen as tolerated   -will need home O2 eval prior to discharge  7-sinus bradycardia:  Asymptomatic  No indication for pacemaker at this time  Avoid beta-blockers     -reviewed patient's outpatient cardiology records from the is office visit 07/11:  Patient has a history of sinus bradycardia in the past as well  8-ambulatory dysfunction:  Continue PT  They recommend inpatient short-term rehab  9-BPH:  Without current symptoms  Continue to monitor    10-chronic anemia:  Patient is on iron as an outpatient    6- family:  Called granddaughter Johnny Byrd and LM to call my cell number for update  VTE Pharmacologic Prophylaxis: Heparin  VTE Mechanical Prophylaxis: sequential compression device    Certification Statement: The patient will continue to require additional inpatient hospital stay due to need for further acute intervention for chf on iv lasix    Status: inpatient     ===================================================================    Subjective:  Patient relates he feels better today  He denies any shortness of breath at rest   He denies any dyspnea with exertion  He denies any chest pain, pressure or discomfort  He denies any nausea, vomiting, diarrhea  He denies any dizziness or lightheadedness with ambulating  He denies any pain anywhere at all  He denies any headache, chest pain, back pain, abdominal or extremity pain  He notes he feels markedly improved  Physical Exam:   Temp:  [97 8 °F (36 6 °C)-98 5 °F (36 9 °C)] 97 8 °F (36 6 °C)  HR:  [60-92] 63  Resp:  [18] 18  BP: (124-161)/(67-93) 153/67    Gen:  Pleasant, non-tachypnic, non-dyspnic  Conversant  Heart: regular rate and rhythm, S1S2 present, no murmur, rub or gallop  Lungs:  Decreased air movement  Decreased breath sounds both bases  No current crackles, wheezing, or rhonchi  No accessory muscle use or respiratory distress  Abd: soft, non-tender, non-distended  NABS, no guarding, rebound or peritoneal signs  Extremities: no clubbing, cyanosis or edema  2+pedal pulses bilaterally  Neuro: awake, alert  Follows commands  Moving all 4 extremities  Very hard of hearing  Skin: warm and dry: no petechiae, purpura and rash      LABS:     Results from last 7 days  Lab Units 08/03/18  0513 08/02/18  1647 08/02/18  1050   WBC Thousand/uL 7 01  --  8 12   HEMOGLOBIN g/dL 14 3  --  14 4   HEMATOCRIT % 48 9  --  48 7   PLATELETS Thousands/uL 179 195 243       Results from last 7 days  Lab Units 08/06/18  0600 08/05/18  0530 08/04/18  0424   SODIUM mmol/L 145 145 144   POTASSIUM mmol/L 4 8 4 4 4 7   CHLORIDE mmol/L 101 103 103   CO2 mmol/L >45* 45* 38*   BUN mg/dL 26* 27* 35*   CREATININE mg/dL 0 82 0 94 1 05   GLUCOSE RANDOM mg/dL 80 103 94   CALCIUM mg/dL 8 8 9 1 8 7       Hospital Data:    8/2:  Chest x-ray:  Bibasilar atelectasis    8/2:  Blood cultures:  Negative x2        ---------------------------------------------------------------------------------------------------------------  This note has been constructed using a voice recognition system

## 2018-08-06 NOTE — PLAN OF CARE

## 2018-08-07 ENCOUNTER — APPOINTMENT (INPATIENT)
Dept: NUCLEAR MEDICINE | Facility: HOSPITAL | Age: 83
DRG: 280 | End: 2018-08-07
Payer: OTHER GOVERNMENT

## 2018-08-07 ENCOUNTER — APPOINTMENT (INPATIENT)
Dept: NON INVASIVE DIAGNOSTICS | Facility: HOSPITAL | Age: 83
DRG: 280 | End: 2018-08-07
Payer: OTHER GOVERNMENT

## 2018-08-07 PROBLEM — E87.2 CHRONIC RESPIRATORY ACIDOSIS: Status: ACTIVE | Noted: 2018-08-07

## 2018-08-07 PROBLEM — E87.3 ALKALOSIS: Status: ACTIVE | Noted: 2018-08-07

## 2018-08-07 LAB
ANION GAP SERPL CALCULATED.3IONS-SCNC: -6 MMOL/L (ref 4–13)
ARTERIAL PATENCY WRIST A: YES
BACTERIA BLD CULT: NORMAL
BACTERIA BLD CULT: NORMAL
BASE EXCESS BLDA CALC-SCNC: 22.8 MMOL/L
BUN SERPL-MCNC: 25 MG/DL (ref 5–25)
CALCIUM SERPL-MCNC: 8.5 MG/DL (ref 8.3–10.1)
CHLORIDE SERPL-SCNC: 99 MMOL/L (ref 100–108)
CO2 SERPL-SCNC: 44 MMOL/L (ref 21–32)
CREAT SERPL-MCNC: 0.63 MG/DL (ref 0.6–1.3)
ERYTHROCYTE [DISTWIDTH] IN BLOOD BY AUTOMATED COUNT: 14.9 % (ref 11.6–15.1)
GFR SERPL CREATININE-BSD FRML MDRD: 82 ML/MIN/1.73SQ M
GLUCOSE SERPL-MCNC: 69 MG/DL (ref 65–140)
HCO3 BLDA-SCNC: 53 MMOL/L (ref 22–28)
HCT VFR BLD AUTO: 41.6 % (ref 36.5–49.3)
HGB BLD-MCNC: 12.2 G/DL (ref 12–17)
MCH RBC QN AUTO: 29.5 PG (ref 26.8–34.3)
MCHC RBC AUTO-ENTMCNC: 29.3 G/DL (ref 31.4–37.4)
MCV RBC AUTO: 101 FL (ref 82–98)
NASAL CANNULA: 3
NT-PROBNP SERPL-MCNC: 5533 PG/ML
O2 CT BLDA-SCNC: 16.3 ML/DL (ref 16–23)
OXYHGB MFR BLDA: 83.5 % (ref 94–97)
PCO2 BLDA: 87 MM HG (ref 36–44)
PH BLDA: 7.4 [PH] (ref 7.35–7.45)
PLATELET # BLD AUTO: 165 THOUSANDS/UL (ref 149–390)
PMV BLD AUTO: 9.4 FL (ref 8.9–12.7)
PO2 BLDA: 47.9 MM HG (ref 75–129)
POTASSIUM SERPL-SCNC: 4.2 MMOL/L (ref 3.5–5.3)
RBC # BLD AUTO: 4.13 MILLION/UL (ref 3.88–5.62)
SODIUM SERPL-SCNC: 137 MMOL/L (ref 136–145)
SPECIMEN SOURCE: ABNORMAL
WBC # BLD AUTO: 7.16 THOUSAND/UL (ref 4.31–10.16)

## 2018-08-07 PROCEDURE — A9540 TC99M MAA: HCPCS

## 2018-08-07 PROCEDURE — 97530 THERAPEUTIC ACTIVITIES: CPT

## 2018-08-07 PROCEDURE — 97116 GAIT TRAINING THERAPY: CPT

## 2018-08-07 PROCEDURE — 36600 WITHDRAWAL OF ARTERIAL BLOOD: CPT

## 2018-08-07 PROCEDURE — 93971 EXTREMITY STUDY: CPT

## 2018-08-07 PROCEDURE — 99232 SBSQ HOSP IP/OBS MODERATE 35: CPT | Performed by: INTERNAL MEDICINE

## 2018-08-07 PROCEDURE — 97166 OT EVAL MOD COMPLEX 45 MIN: CPT

## 2018-08-07 PROCEDURE — 82805 BLOOD GASES W/O2 SATURATION: CPT | Performed by: INTERNAL MEDICINE

## 2018-08-07 PROCEDURE — 99233 SBSQ HOSP IP/OBS HIGH 50: CPT | Performed by: INTERNAL MEDICINE

## 2018-08-07 PROCEDURE — G8987 SELF CARE CURRENT STATUS: HCPCS

## 2018-08-07 PROCEDURE — 78582 LUNG VENTILAT&PERFUS IMAGING: CPT

## 2018-08-07 PROCEDURE — 83880 ASSAY OF NATRIURETIC PEPTIDE: CPT | Performed by: PHYSICIAN ASSISTANT

## 2018-08-07 PROCEDURE — 93971 EXTREMITY STUDY: CPT | Performed by: SURGERY

## 2018-08-07 PROCEDURE — 85027 COMPLETE CBC AUTOMATED: CPT | Performed by: INTERNAL MEDICINE

## 2018-08-07 PROCEDURE — 80048 BASIC METABOLIC PNL TOTAL CA: CPT | Performed by: INTERNAL MEDICINE

## 2018-08-07 PROCEDURE — G8988 SELF CARE GOAL STATUS: HCPCS

## 2018-08-07 PROCEDURE — A9558 XE133 XENON 10MCI: HCPCS

## 2018-08-07 RX ORDER — ACETAZOLAMIDE 500 MG/5ML
250 INJECTION, POWDER, LYOPHILIZED, FOR SOLUTION INTRAVENOUS EVERY 6 HOURS SCHEDULED
Status: DISCONTINUED | OUTPATIENT
Start: 2018-08-07 | End: 2018-08-07 | Stop reason: CLARIF

## 2018-08-07 RX ADMIN — FLUTICASONE PROPIONATE 1 SPRAY: 50 SPRAY, METERED NASAL at 09:26

## 2018-08-07 RX ADMIN — LATANOPROST 1 DROP: 50 SOLUTION OPHTHALMIC at 09:26

## 2018-08-07 RX ADMIN — Medication 325 MG: at 17:44

## 2018-08-07 RX ADMIN — ACETAZOLAMIDE: 500 INJECTION, POWDER, LYOPHILIZED, FOR SOLUTION INTRAVENOUS at 23:54

## 2018-08-07 RX ADMIN — Medication 325 MG: at 09:25

## 2018-08-07 RX ADMIN — PANTOPRAZOLE SODIUM 20 MG: 20 TABLET, DELAYED RELEASE ORAL at 05:24

## 2018-08-07 RX ADMIN — ACETAZOLAMIDE: 500 INJECTION, POWDER, LYOPHILIZED, FOR SOLUTION INTRAVENOUS at 17:48

## 2018-08-07 RX ADMIN — HEPARIN SODIUM 5000 UNITS: 5000 INJECTION, SOLUTION INTRAVENOUS; SUBCUTANEOUS at 21:42

## 2018-08-07 RX ADMIN — ASPIRIN 81 MG: 81 TABLET, COATED ORAL at 09:25

## 2018-08-07 RX ADMIN — HEPARIN SODIUM 5000 UNITS: 5000 INJECTION, SOLUTION INTRAVENOUS; SUBCUTANEOUS at 14:43

## 2018-08-07 RX ADMIN — ATORVASTATIN CALCIUM 40 MG: 40 TABLET, FILM COATED ORAL at 17:44

## 2018-08-07 RX ADMIN — FLUTICASONE PROPIONATE 1 SPRAY: 50 SPRAY, METERED NASAL at 17:44

## 2018-08-07 RX ADMIN — HEPARIN SODIUM 5000 UNITS: 5000 INJECTION, SOLUTION INTRAVENOUS; SUBCUTANEOUS at 05:24

## 2018-08-07 RX ADMIN — FINASTERIDE 5 MG: 5 TABLET, FILM COATED ORAL at 09:26

## 2018-08-07 RX ADMIN — CHLORHEXIDINE GLUCONATE 15 ML: 1.2 RINSE ORAL at 21:41

## 2018-08-07 RX ADMIN — ACETAZOLAMIDE: 500 INJECTION, POWDER, LYOPHILIZED, FOR SOLUTION INTRAVENOUS at 14:44

## 2018-08-07 RX ADMIN — TIMOLOL MALEATE 1 DROP: 2.5 SOLUTION OPHTHALMIC at 09:26

## 2018-08-07 RX ADMIN — CHLORHEXIDINE GLUCONATE 15 ML: 1.2 RINSE ORAL at 09:26

## 2018-08-07 NOTE — PROGRESS NOTES
Progress Note - Cardiology   Tyra Lee  80 y o  male MRN: [id number]  Unit/Bed#: E4 -01 Encounter: [id number]      Assessment:     1  Acute hypoxic respiratory failure possibly secondary to diastolic heart failure  2  Coronary disease status post remote myocardial infarction and possible stenting  3  Probable contraction alkalosis  4  Hypertension  5  Dyslipidemia    Discussion/Recommendations:    D-dimer elevated but reportedly negative for his age  Would consider V/Q scans and lower extremity Dopplers to more officially rule out any pulmonary embolus  Will check ABG and consider Diamox as has significantly elevated bicarbonate      Subjective:  Seems a bit confused-denies any chest pain or trouble breathing    Physical Exam:  GEN:  NAD  HEENT:  MMM, NCAT, pink conjunctiva, EOMI, nonicteric sclera  CV:  Mild JVD, regularly irregular, NO M/R/G, +S1/S2, NO PARASTERNAL HEAVE/THRILL, NO LE EDEMA, NO HEPATIC SYSTOLIC PULSATION, WARM EXTREMITIES  RESP:  CTAB/L  ABD:  SOFT, NT, NO GROSS ORGANOMEGALY    Vitals:   /78 (BP Location: Left arm)   Pulse 82   Temp (!) 97 4 °F (36 3 °C) (Temporal)   Resp 18   Ht 5' 5" (1 651 m)   Wt 53 7 kg (118 lb 6 2 oz)   SpO2 95%   BMI 19 70 kg/m²   Vitals:    08/02/18 1035 08/07/18 0530   Weight: 65 9 kg (145 lb 4 5 oz) 53 7 kg (118 lb 6 2 oz)       Intake/Output Summary (Last 24 hours) at 08/07/18 0851  Last data filed at 08/07/18 0245   Gross per 24 hour   Intake                0 ml   Output              790 ml   Net             -790 ml         Lab Results:    Results from last 7 days  Lab Units 08/07/18  0549   WBC Thousand/uL 7 16   HEMOGLOBIN g/dL 12 2   HEMATOCRIT % 41 6   PLATELETS Thousands/uL 165       Results from last 7 days  Lab Units 08/07/18  0548  08/02/18  1052   SODIUM mmol/L 137  < > 142   POTASSIUM mmol/L 4 2  < > 5 0   CHLORIDE mmol/L 99*  < > 102   CO2 mmol/L 44*  < > 37*   BUN mg/dL 25  < > 29*   CREATININE mg/dL 0 63  < > 1 24 CALCIUM mg/dL 8 5  < > 8 8   TOTAL PROTEIN g/dL  --   --  6 7   BILIRUBIN TOTAL mg/dL  --   --  0 51   ALK PHOS U/L  --   --  77   ALT U/L  --   --  41   AST U/L  --   --  38   GLUCOSE RANDOM mg/dL 69  < > 114   < > = values in this interval not displayed      Results from last 7 days  Lab Units 08/07/18  0548   SODIUM mmol/L 137   POTASSIUM mmol/L 4 2   CHLORIDE mmol/L 99*   CO2 mmol/L 44*   BUN mg/dL 25   CREATININE mg/dL 0 63   GLUCOSE RANDOM mg/dL 69   CALCIUM mg/dL 8 5             Medications:    Current Facility-Administered Medications:     acetaminophen (TYLENOL) tablet 650 mg, 650 mg, Oral, Q6H PRN, Edelmira Rich MD, 650 mg at 08/05/18 2141    aspirin (ECOTRIN LOW STRENGTH) EC tablet 81 mg, 81 mg, Oral, Daily, Fatoumata Peñaloza MD, 81 mg at 08/06/18 0854    atorvastatin (LIPITOR) tablet 40 mg, 40 mg, Oral, Daily With Rosa Becerra MD, 40 mg at 08/06/18 1806    bisacodyl (DULCOLAX) EC tablet 5 mg, 5 mg, Oral, Daily PRN, Edelmira Rich MD    chlorhexidine (PERIDEX) 0 12 % oral rinse 15 mL, 15 mL, [employer]ish [employer], Q12H [facility], Edelmira Rich MD, 15 mL at 08/06/18 2109    docusate sodium (COLACE) capsule 100 mg, 100 mg, Oral, Daily PRN, Edelmira Rich MD    ferrous sulfate tablet 325 mg, 325 mg, Oral, BID, Edelmira Rich MD, 325 mg at 08/06/18 1806    finasteride (PROSCAR) tablet 5 mg, 5 mg, Oral, Daily, Edelmira Rich MD, 5 mg at 08/06/18 0854    fluticasone (FLONASE) 50 mcg/act nasal spray 1 spray, 1 spray, Nasal, BID, Edelmira Rich MD, 1 spray at 08/06/18 1811    furosemide (LASIX) injection 40 mg, 40 mg, Intravenous, Daily, Edelmira Rich MD, 40 mg at 08/06/18 0854    heparin (porcine) subcutaneous injection 5,000 Units, 5,000 Units, Subcutaneous, Q8H [facility], 5,000 Units at 08/07/18 0524 **AND** Platelet count, , , Once, Edelmira Rich MD    latanoprost (XALATAN) 0 005 % ophthalmic solution 1 drop, 1 drop, Left Eye, Daily, Edelmira Rich MD, 1 drop at 08/06/18 0855    pantoprazole (PROTONIX) EC tablet 20 mg, 20 mg, Oral, Early Morning, Mary Curtis MD, 20 mg at 08/07/18 0524    timolol (TIMOPTIC) 0 25 % ophthalmic solution 1 drop, 1 drop, Left Eye, Daily, Mary Curtis MD, 1 drop at 08/06/18 6760    Portions of the record may have been created with voice recognition software  Occasional wrong word or "sound a like" substitutions may have occurred due to the inherent limitations of voice recognition software  Read the chart carefully and recognize, using context, where substitutions have occurred

## 2018-08-07 NOTE — ASSESSMENT & PLAN NOTE
· Initially required BiPAP and currently weaned down to 3L O2 via nasal cannula  · Wean O2 as tolerated  · Chest x-ray on admission showed atelectasis- incentive spirometer  · VQ scan and lower extremity doppler was ordered to r/o PE as a cause- f/u results  · ABG today showed high pCO2, low pO2- pulmonary was consulted

## 2018-08-07 NOTE — PLAN OF CARE
Problem: Potential for Falls  Goal: Patient will remain free of falls  INTERVENTIONS:  - Assess patient frequently for physical needs  -  Identify cognitive and physical deficits and behaviors that affect risk of falls  -  [place] fall precautions as indicated by assessment   - Educate patient/family on patient safety including physical limitations  - Instruct patient to call for assistance with activity based on assessment  - Modify environment to reduce risk of injury  - Consider OT/PT consult to assist with strengthening/mobility   Outcome: Progressing      Problem: Prexisting or High Potential for Compromised Skin Integrity  Goal: Skin integrity is maintained or improved  INTERVENTIONS:  - Identify patients at risk for skin breakdown  - Assess and monitor skin integrity  - Assess and monitor nutrition and hydration status  - Monitor labs (i e  albumin)  - Assess for incontinence   - Turn and reposition patient  - Assist with mobility/ambulation  - Relieve pressure over bony prominences  - Avoid friction and shearing  - Provide appropriate hygiene as needed including keeping skin clean and dry  - Evaluate need for skin moisturizer/barrier cream  - Collaborate with interdisciplinary team (i e  Nutrition, Rehabilitation, etc )   - Patient/family teaching   Outcome: Progressing      Problem: Nutrition/Hydration-ADULT  Goal: Nutrient/Hydration intake appropriate for improving, restoring or maintaining nutritional needs  Monitor and assess patient's nutrition/hydration status for malnutrition (ex- brittle hair, bruises, dry skin, pale skin and conjunctiva, muscle wasting, smooth red tongue, and disorientation)  Collaborate with interdisciplinary team and initiate plan and interventions as ordered  Monitor patient's weight and dietary intake as ordered or per policy  Utilize nutrition screening tool and intervene per policy   Determine patient's food preferences and provide high-protein, high-caloric foods as appropriate       INTERVENTIONS:  - Monitor oral intake, urinary output, labs, and treatment plans  - Assess nutrition and hydration status and recommend course of action  - Evaluate amount of meals eaten  - Assist patient with eating if necessary   - Allow adequate time for meals  - Recommend/ encourage appropriate diets, oral nutritional supplements, and vitamin/mineral supplements  - Order, calculate, and assess calorie counts as needed  - Recommend, monitor, and adjust tube feedings and TPN/PPN based on assessed needs  - Assess need for intravenous fluids  - Provide specific nutrition/hydration education as appropriate  - Include patient/family/caregiver in decisions related to nutrition   Outcome: Progressing      Problem: RESPIRATORY - ADULT  Goal: Achieves optimal ventilation and oxygenation  INTERVENTIONS:  - Assess for changes in respiratory status  - Assess for changes in mentation and behavior  - Position to facilitate oxygenation and minimize respiratory effort  - Oxygen administration by appropriate delivery method based on oxygen saturation (per order) or ABGs  - Initiate smoking cessation education as indicated  - Encourage broncho-pulmonary hygiene including cough, deep breathe, Incentive Spirometry  - Assess the need for suctioning and aspirate as needed  - Assess and instruct to report SOB or any respiratory difficulty  - Respiratory Therapy support as indicated   Outcome: Progressing      Problem: METABOLIC, FLUID AND ELECTROLYTES - ADULT  Goal: Electrolytes maintained within normal limits  INTERVENTIONS:  - Monitor labs and assess patient for signs and symptoms of electrolyte imbalances  - Administer electrolyte replacement as ordered  - Monitor response to electrolyte replacements, including repeat lab results as appropriate  - Instruct patient on fluid and nutrition as appropriate   Outcome: Progressing    Goal: Fluid balance maintained  INTERVENTIONS:  - Monitor labs and assess for signs and symptoms of volume excess or deficit  - Monitor I/O and WT  - Instruct patient on fluid and nutrition as appropriate   Outcome: Progressing      Problem: SKIN/TISSUE INTEGRITY - ADULT  Goal: Skin integrity remains intact  INTERVENTIONS  - Identify patients at risk for skin breakdown  - Assess and monitor skin integrity  - Assess and monitor nutrition and hydration status  - Monitor labs (i e  albumin)  - Assess for incontinence   - Turn and reposition patient  - Assist with mobility/ambulation  - Relieve pressure over bony prominences  - Avoid friction and shearing  - Provide appropriate hygiene as needed including keeping skin clean and dry  - Evaluate need for skin moisturizer/barrier cream  - Collaborate with interdisciplinary team (i e  Nutrition, Rehabilitation, etc )   - Patient/family teaching   Outcome: Progressing      Problem: MUSCULOSKELETAL - ADULT  Goal: Maintain or return mobility to safest level of function  INTERVENTIONS:  - Assess patient's ability to carry out ADLs; assess patient's baseline for ADL function and identify physical deficits which impact ability to perform ADLs (bathing, care of mouth/teeth, toileting, grooming, dressing, etc )  - Assess/evaluate cause of self-care deficits   - Assess range of motion  - Assess patient's mobility; develop plan if impaired  - Assess patient's need for assistive devices and provide as appropriate  - Encourage maximum independence but intervene and supervise when necessary  - Involve family in performance of ADLs  - Assess for home care needs following discharge   - Request OT consult to assist with ADL evaluation and planning for discharge  - Provide patient education as appropriate   Outcome: Progressing      Problem: DISCHARGE PLANNING - CARE MANAGEMENT  Goal: Discharge to post-acute care or home with appropriate resources  INTERVENTIONS:  - Conduct assessment to determine patient/family and health care team treatment goals, and need for post-acute services based on payer coverage, community resources, and patient preferences, and barriers to discharge  - Address psychosocial, clinical, and financial barriers to discharge as identified in assessment in conjunction with the patient/family and health care team  - Arrange appropriate level of post-acute services according to patients   needs and preference and payer coverage in collaboration with the physician and health care team  - Communicate with and update the patient/family, physician, and health care team regarding progress on the discharge plan  - Arrange appropriate transportation to post-acute venues   Outcome: Progressing

## 2018-08-07 NOTE — OCCUPATIONAL THERAPY NOTE
[address] Evaluation     Patient Name: Joy   Today's Date: 8/7/2018  Problem List  Patient Active Problem List   Diagnosis    Sinus bradycardia    Essential hypertension    Pure hypercholesterolemia    Coronary artery disease involving native heart without angina pectoris    Non-ST elevation (NSTEMI) myocardial infarction ([facility] [address] )    Acute respiratory failure with hypoxia (HCC)    Acute respiratory failure with hypoxia and hypercarbia (HCC)    Acute on chronic diastolic CHF (congestive heart failure) (HCC)    Troponin I above reference range    BPH (benign prostatic hyperplasia)    Ambulatory dysfunction     Past Medical History  Past Medical History:   Diagnosis Date    Anemia     Cardiac disease     GERD (gastroesophageal reflux disease)     History of BPH      Past Surgical History  History reviewed  No pertinent surgical history  08/07/18 0940   Note Type   Note type Eval/Treat   Restrictions/Precautions   Weight Bearing Precautions Per Order No   Other Precautions Fall Risk;Telemetry;O2; Chair Alarm; Bed Alarm;Cognitive;Hard of hearing   Pain Assessment   Pain Assessment No/denies pain   Pain Score No Pain   Home Living   Type of Home Apartment   Home Layout One level   Bathroom Shower/Tub Walk-in shower   Bathroom Toilet Standard   Bathroom Accessibility Accessible   Home Equipment Walker;Electric scooter   Additional Comments pt would not answer questions at times, information taken from CM notes   Prior Function   Level of [place] Independent with ADLs and functional mobility   Lives With Alone   Receives Help From Family;Home health   ADL Assistance Independent   IADLs Needs assistance  (home aides)   Falls in the last 6 months (unable to report)   Vocational Retired   Comments pt per [employer] notes use of RW in apt and scooter in community, pt has home aides 2hours in am and 1 hour in pm to assist w/ bathing/dressing and meals, use of [employer] per CM note, independent w/ Dressing, assist bathing, independent w/ functional transfers and mobility w/ RW or electric scooter in community,grandauadrienneters do grocery shopping or pt takes electric scooter to [employer] granddaughters   Service to Others retired    Intrinsic Gratification going out on his scooter   ADL   Where [phone]  Supervision/Setup   Grooming Assistance 5  Supervision/Setup   Grooming Deficit Teeth care;Setup;Supervision/safety; Increased time to complete   UB Bathing Assistance 4  Minimal Assistance   LB Bathing Assistance 3  Moderate Assistance   UB Dressing Assistance 4  Minimal [address] 3  Moderate Assistance   Toileting Assistance  3  Moderate Assistance   Bed Mobility   Supine to Sit 4  Minimal assistance   Additional items Assist x 2; Increased time required;LE management;Verbal cues; Bedrails;HOB elevated   Additional Comments increased time to complete   Transfers   Sit to Stand 3  Moderate assistance   Additional items Assist x 1; Increased time required;Verbal cues;Armrests  (SBA of 2nd for safety)   Stand to Sit 3  Moderate assistance   Additional items Increased time required;Verbal cues;Armrests;Assist x 2  (controlled descent)   Additional Comments cues for safety and positioning, pt wanted to transfer w/o using RW, provided pt w/ RW for transfer   Functional Mobility   Functional Mobility 3  Moderate assistance   Additional Comments assist x1 w/ SBA of 2nd for safety   Additional items Rolling walker   Balance   Static Sitting Fair   Dynamic Sitting Fair -   Static Standing Poor +   Dynamic Standing Poor   Ambulatory Poor   Activity Tolerance   Activity Tolerance Patient limited by fatigue;Treatment limited secondary to agitation   Medical Staff Made Aware JACQUI Vega and VIOLETTE Jones   Nurse Made Aware appropriate to see per Joy OSMAN Assessment   RUE Assessment [facility]   RUE Strength   RUE Overall Strength Within Functional Limits - able to perform ADL tasks with strength  (grossly 3+/5)   LUE Assessment   LUE Assessment WFL   LUE Strength   LUE Overall Strength Within Functional Limits - able to perform ADL tasks with strength  (grossly 3+/5)   Hand Function   Gross Motor Coordination Functional   Fine Motor Coordination Functional   Sensation   Light Touch No apparent deficits   Sharp/Dull No apparent deficits   Proprioception   Proprioception No apparent deficits   Vision-Basic Assessment   Current Vision No visual deficits   Vision - Complex Assessment   Ocular Range of Motion [facility]   Perception   Inattention/Neglect Appears intact   Cognition   Overall Cognitive Status Impaired   Arousal/Participation Alert; Cooperative   Attention Attends with cues to redirect   Orientation Level Oriented to person;Oriented to place  (pt reported friday)   Memory Decreased short term memory;Decreased recall of recent events;Decreased recall of precautions   Following Commands Follows one step commands with increased time or repetition   Comments pt irritable and very demanding, pt impaired insight and safety awareness   Assessment   Limitation Decreased ADL status; Decreased UE strength;Decreased cognition;Decreased Safe judgement during ADL;Decreased endurance;Decreased self-care trans;Decreased high-level ADLs; Decreased fine motor control   Prognosis Good   Assessment Pt is a 80 y o  male seen for OT evaluation s/p admit to [facility] on 8/2/2018 w/ Acute respiratory failure with hypoxia and hypercarbia (HCC) and when in ED pt pulse ox in 60s and required BiPAP resuscitation  Pt currently on 3L O2, no O2 at baseline  Comorbidities affecting pt's functional performance at time of assessment include:NSTEMI, CAD, sinus bradycardia  Personal factors affecting pt at time of IE include:lives alone   Prior to admission, pt was living alone and reports independent w/ ADLs, independent w/ functional transfers and mobility w/ RW or use of electric scooter in community, berry do grocery shopping  Pt w/ home aides 2 hrs in am and 1 hrs in pm to assist w/ bathing and meals  Upon evaluation: Pt requires MIN assist x2 supine>sit bed mobility, MOD assist x1 w/ SBA of 2nd for functional transfers and mobility w/ RW (cues for safety as pt attempting to stand w/ no AD), MOD assist LB ADLs, MIN assist UB ADLS, setup grooming 2* the following deficits impacting occupational performance: decreased strength and endurance, impaired balance, impaired activity tolerance, impaired cognition (STM, insight and safety awareness), pt demanding and agitated and at times refusing to answer questions  Pt to benefit from continued skilled OT tx while in the hospital to address deficits as defined above and maximize level of functional independence w ADL's and functional mobility  Occupational Performance areas to address include: grooming, bathing/shower, toilet hygiene, dressing, functional mobility, clothing management, cleaning and meal prep, formal cognitive assessment, safety education  From OT standpoint, recommendation at time of d/c would be short term rehab  Goals   Patient Goals unable to report pt agitated to answer goal   LTG Time Frame 10-14   Long Term Goal please see below goals   Plan   Treatment Interventions ADL retraining;Functional transfer training;UE strengthening/ROM; Endurance training;Cognitive reorientation;Patient/family training;Equipment evaluation/education; Neuromuscular reeducation; Compensatory technique education; Energy conservation; Activityengagement   Goal Expiration Date 08/21/18   OT Frequency 3-5x/wk   Recommendation   Recommendation Geriatric Consult   OT Discharge Recommendation Short Term Rehab   OT - OK to Discharge (to [facility])   Barthel Index   Feeding 10   Bathing 0   Grooming Score 5   Dressing Score 5   Bladder Score 5   Bowels Score 10   Toilet Use Score 5   Transfers (Bed/Chair) Score 5   Mobility (Level Surface) Score 0   Stairs Score 0   Barthel Index Score 45   Modified [place] Scale   Modified [place] Scale 4     Occupational Therapy Goals to be met in 7-10 days:  1) Pt will improve activity tolerance to G for min 30 min txment sessions to enhance ADLs  2) Pt will complete ADLs/self care w/ mod I and bathing w/ supervision  3) Pt will complete toileting w/ mod I w/ G hygiene/thoroughness using DME PRN  4) Pt will improve functional transfers on/off all surfaces using DME PRN w/ G balance/safety including toileting w/ mod I  5) Pt will improve fx'l mobility during I/ADl/leisure tasks using DME PRN w/ g balance/safety w/ mod I  6) Pt will engage in ongoing cognitive assessment w/ G participation to A w/ safe d/c planning/recommendations  7) Pt will demonstrate G carryover of pt/caregiver education and training as appropriate w/ mod I  w/ G tolerance  8) Pt will demonstrate 100% carryover of E C  techniques w/ mod I t/o fx'l I/ADL/leisure tasks w/o cues s/p skilled education  9) Pt will demonstrate improved bed mobility to MOD I to enhance ADLs  10) Pt will demonstrate improved standing tolerance to 3-5 minutes during functional tasks w/ no LOB to enhance ADL performance  11) Pt will demonstrate improved b/l UE strength by 1 MMT grade to enhance ADLS and functional transfers    Documentation completed by: Demetri Romero MS, OTR/L

## 2018-08-07 NOTE — ASSESSMENT & PLAN NOTE
· Continues on IV Lasix  · EF 75%  · I&Os, daily weights  · Cardiology following  · No ACEI due to history of hyperkalemia   No BB due to bradycardia

## 2018-08-07 NOTE — PLAN OF CARE
Problem: OCCUPATIONAL THERAPY ADULT  Goal: Performs self-care activities at highest level of function for planned discharge setting  See evaluation for individualized goals  Treatment Interventions: ADL retraining, Functional transfer training, UE strengthening/ROM, Endurance training, Cognitive reorientation, Patient/family training, Equipment evaluation/education, Neuromuscular reeducation, Compensatory technique education, Energy conservation, Activityengagement          See flowsheet documentation for full assessment, interventions and recommendations  Limitation: Decreased ADL status, Decreased UE strength, Decreased cognition, Decreased Safe judgement during ADL, Decreased endurance, Decreased self-care trans, Decreased high-level ADLs, Decreased fine motor control  Prognosis: Good  Assessment: Pt is a 80 y o  male seen for OT evaluation s/p admit to [facility] on 8/2/2018 w/ Acute respiratory failure with hypoxia and hypercarbia (HCC) and when in ED pt pulse ox in 60s and required BiPAP resuscitation  Pt currently on 3L O2, no O2 at baseline  Comorbidities affecting pt's functional performance at time of assessment include:NSTEMI, CAD, sinus bradycardia  Personal factors affecting pt at time of IE include:lives alone  Prior to admission, pt was living alone and reports independent w/ ADLs, independent w/ functional transfers and mobility w/ RW or use of electric scooter in community, granddaughters do grocery shopping  Pt w/ home aides 2 hrs in am and 1 hrs in pm to assist w/ bathing and meals   Upon evaluation: Pt requires MIN assist x2 supine>sit bed mobility, MOD assist x1 w/ SBA of 2nd for functional transfers and mobility w/ RW (cues for safety as pt attempting to stand w/ no AD), MOD assist LB ADLs, MIN assist UB ADLS, setup grooming 2* the following deficits impacting occupational performance: decreased strength and endurance, impaired balance, impaired activity tolerance, impaired cognition (STM, insight and safety awareness), pt demanding and agitated and at times refusing to answer questions  Pt to benefit from continued skilled OT tx while in the hospital to address deficits as defined above and maximize level of functional independence w ADL's and functional mobility  Occupational Performance areas to address include: grooming, bathing/shower, toilet hygiene, dressing, functional mobility, clothing management, cleaning and meal prep, formal cognitive assessment, safety education  From OT standpoint, recommendation at time of d/c would be short term rehab     Recommendation: Geriatric Consult  OT Discharge Recommendation: Short Term Rehab  OT - OK to Discharge:  (to [facility])      Comments: Clem Zaidi MS, OTR/L

## 2018-08-07 NOTE — PROGRESS NOTES
Progress Note - Jud Baez  80 y o  male MRN: [id number]    Unit/Bed#: E4 -01 Encounter: [id number]      Assessment/Plan:  1-acute/chronic diastolic congestive heart failure exacerbation:   patient presented with evidence of volume overload and acute/chronic diastolic congestive heart failure exacerbation  -he is clinically slightly improved  -his weight has decreased 12 kg since admission   -Pro BNP improved from 19,000 to 5,000   -Continue IV diuresis: This pain may changed to p o  Lasix in a m               -currently on Lasix 40 mg IV daily              -monitor daily weights and creatinine              -echocardiogram from 08/03 with left ventricular ejection fraction 75%  No regional wall motion abnormalities  Wall thickness mildly-moderately increased  Right ventricle mildly-moderately dilated      2-essential hypertension:  Blood pressure currently adequately controlled    -blood pressure today ranged 102//65              -Continue to monitor on current regimen        -Continue to hold ACE-inhibitor due to prior hyperkalemia                 -If patient requires additional agent, cardiology recommends Norvasc      3-hyperlipidemia:  Continue statin     4-status post hyperkalemia:  ACE-inhibitor on hold  Hyperkalemia resolved      5-coronary artery disease: With history of previous MRI  Patient presented with chest pain and a positive troponin  He was evaluated by the cardiology service  His positive troponin was felt to be most likely secondary to a non STEMI type 2, secondary to his congestive heart failure  Further ischemic testing was not recommended by Cardiology at this time               -continue medical therapy with aspirin, statin               -no beta-blocker due to bradycardia     6-acute hypoxic/hypercapnic respiratory failure:  Patient presented to the ER in respiratory distress with hypoxia/hypercapnia    He initially was admitted to the step-down unit and required BiPAP  -continue to monitor and titrate off oxygen as tolerated              -will need home O2 eval prior to discharge  -V/Q scan low probability for pulmonary embolism   -pulmonary service notes the patient has chronic, severe, respiratory acidosis  Question whether he has obstructive sleep apnea/hypoventilation syndrome   -no history of COPD upon review of his old charts     7-sinus bradycardia:  Asymptomatic  No indication for pacemaker at this time  Avoid beta-blockers  -reviewed patient's outpatient cardiology records from the is office visit 07/11:  Patient has a history of sinus bradycardia in the past as well      8-ambulatory dysfunction:  Continue PT  They recommend inpatient short-term rehab   is followed     9-BPH:  Without current symptoms  Continue to monitor     10-chronic anemia:  Patient is on iron as an outpatient    11-chronic, severe, respiratory acidosis:  Patient's ABG reveals a normal pH, and patient is compensating for chronic severe respiratory acidosis, with compensatory metabolic alkalosis  He was evaluated by the pulmonary service  He was started on Diamox  BiPAP is ordered at bedtime  Continue to monitor     12- family:  Called granddaughter Shea Clark and gave update    Discussed with patient's nurse  Discussed with cardiologist Dr Nathanael Corado  D/w; pulmonary team    Await BLE US    Dispo: to STR        VTE Pharmacologic Prophylaxis: Heparin  VTE Mechanical Prophylaxis: sequential compression device      Certification Statement: The patient will continue to require additional inpatient hospital stay due to need for further acute intervention for respiratory acidosis- started on iv diamox    Status: inpatient   Total time spent today including interview/exam, and d/w cardiology and pulmonary teams: 50 min  =========================================    Subjective:  Patient denies any current complaints    He notes he feels much better  He denies any current shortness of breath or dyspnea on exertion  He denies any cough  He denies any pain anywhere at all  He denies any headache, chest pain, back pain, abdominal pain  He denies any extremity pain  He denies any nausea, vomiting, diarrhea  He is tolerating p o  Pozo Spinner Physical Exam:   Temp:  [96 8 °F (36 °C)-97 8 °F (36 6 °C)] 97 4 °F (36 3 °C)  HR:  [63-82] 82  Resp:  [18] 18  BP: (102-153)/(67-78) 102/78    Gen:  Pleasant, non-tachypnic, non-dyspnic  Conversant  Sitting up in a chair at the bedside  Heart: regular rate and rhythm, S1S2 present, 1/6 systolic ejection murmur  No rub or gallop  Lungs:  Markedly decreased air movement bilaterally  Prolonged expiratory phase  However no wheezes, crackles, or rhonchi currently appreciated  No accessory muscle use or respiratory distress  Abd: soft, non-tender, non-distended  NABS, no guarding, rebound or peritoneal signs  Extremities: no clubbing, cyanosis or edema  2+pedal pulses bilaterally  Neuro: awake, alert  Fluent speech  Moving all 4 extremities  Answers questions appropriately  Follows commands  Skin: warm and dry: no petechiae, purpura and rash      LABS:     Results from last 7 days  Lab Units 08/07/18  0549 08/03/18  0513 08/02/18  1647 08/02/18  1050   WBC Thousand/uL 7 16 7 01  --  8 12   HEMOGLOBIN g/dL 12 2 14 3  --  14 4   HEMATOCRIT % 41 6 48 9  --  48 7   PLATELETS Thousands/uL 165 179 195 243       Results from last 7 days  Lab Units 08/07/18  0548 08/06/18  0600 08/05/18  0530   SODIUM mmol/L 137 145 145   POTASSIUM mmol/L 4 2 4 8 4 4   CHLORIDE mmol/L 99* 101 103   CO2 mmol/L 44* >45* 45*   BUN mg/dL 25 26* 27*   CREATININE mg/dL 0 63 0 82 0 94   GLUCOSE RANDOM mg/dL 69 80 103   CALCIUM mg/dL 8 5 8 8 9 1       Hospital Data:  8/7:  V/Q scan:  Low probability pulmonary embolus    8/7:  Lower extremity Dopplers:  Pending    8/2:  Chest x-ray:  Bibasilar atelectasis     8/2:  Blood cultures: Negative x2        ---------------------------------------------------------------------------------------------------------------  This note has been constructed using a voice recognition system

## 2018-08-07 NOTE — PLAN OF CARE
Problem: Potential for Falls  Goal: Patient will remain free of falls  INTERVENTIONS:  - Assess patient frequently for physical needs  -  Identify cognitive and physical deficits and behaviors that affect risk of falls  -  [place] fall precautions as indicated by assessment   - Educate patient/family on patient safety including physical limitations  - Instruct patient to call for assistance with activity based on assessment  - Modify environment to reduce risk of injury  - Consider OT/PT consult to assist with strengthening/mobility   Outcome: Progressing      Problem: Prexisting or High Potential for Compromised Skin Integrity  Goal: Skin integrity is maintained or improved  INTERVENTIONS:  - Identify patients at risk for skin breakdown  - Assess and monitor skin integrity  - Assess and monitor nutrition and hydration status  - Monitor labs (i e  albumin)  - Assess for incontinence   - Turn and reposition patient  - Assist with mobility/ambulation  - Relieve pressure over bony prominences  - Avoid friction and shearing  - Provide appropriate hygiene as needed including keeping skin clean and dry  - Evaluate need for skin moisturizer/barrier cream  - Collaborate with interdisciplinary team (i e  Nutrition, Rehabilitation, etc )   - Patient/family teaching   Outcome: Progressing      Problem: Nutrition/Hydration-ADULT  Goal: Nutrient/Hydration intake appropriate for improving, restoring or maintaining nutritional needs  Monitor and assess patient's nutrition/hydration status for malnutrition (ex- brittle hair, bruises, dry skin, pale skin and conjunctiva, muscle wasting, smooth red tongue, and disorientation)  Collaborate with interdisciplinary team and initiate plan and interventions as ordered  Monitor patient's weight and dietary intake as ordered or per policy  Utilize nutrition screening tool and intervene per policy   Determine patient's food preferences and provide high-protein, high-caloric foods as appropriate       INTERVENTIONS:  - Monitor oral intake, urinary output, labs, and treatment plans  - Assess nutrition and hydration status and recommend course of action  - Evaluate amount of meals eaten  - Assist patient with eating if necessary   - Allow adequate time for meals  - Recommend/ encourage appropriate diets, oral nutritional supplements, and vitamin/mineral supplements  - Order, calculate, and assess calorie counts as needed  - Recommend, monitor, and adjust tube feedings and TPN/PPN based on assessed needs  - Assess need for intravenous fluids  - Provide specific nutrition/hydration education as appropriate  - Include patient/family/caregiver in decisions related to nutrition   Outcome: Progressing      Problem: RESPIRATORY - ADULT  Goal: Achieves optimal ventilation and oxygenation  INTERVENTIONS:  - Assess for changes in respiratory status  - Assess for changes in mentation and behavior  - Position to facilitate oxygenation and minimize respiratory effort  - Oxygen administration by appropriate delivery method based on oxygen saturation (per order) or ABGs  - Initiate smoking cessation education as indicated  - Encourage broncho-pulmonary hygiene including cough, deep breathe, Incentive Spirometry  - Assess the need for suctioning and aspirate as needed  - Assess and instruct to report SOB or any respiratory difficulty  - Respiratory Therapy support as indicated   Outcome: Progressing      Problem: METABOLIC, FLUID AND ELECTROLYTES - ADULT  Goal: Electrolytes maintained within normal limits  INTERVENTIONS:  - Monitor labs and assess patient for signs and symptoms of electrolyte imbalances  - Administer electrolyte replacement as ordered  - Monitor response to electrolyte replacements, including repeat lab results as appropriate  - Instruct patient on fluid and nutrition as appropriate   Outcome: Progressing    Goal: Fluid balance maintained  INTERVENTIONS:  - Monitor labs and assess for signs and symptoms of volume excess or deficit  - Monitor I/O and WT  - Instruct patient on fluid and nutrition as appropriate   Outcome: Progressing      Problem: SKIN/TISSUE INTEGRITY - ADULT  Goal: Skin integrity remains intact  INTERVENTIONS  - Identify patients at risk for skin breakdown  - Assess and monitor skin integrity  - Assess and monitor nutrition and hydration status  - Monitor labs (i e  albumin)  - Assess for incontinence   - Turn and reposition patient  - Assist with mobility/ambulation  - Relieve pressure over bony prominences  - Avoid friction and shearing  - Provide appropriate hygiene as needed including keeping skin clean and dry  - Evaluate need for skin moisturizer/barrier cream  - Collaborate with interdisciplinary team (i e  Nutrition, Rehabilitation, etc )   - Patient/family teaching   Outcome: Progressing      Problem: MUSCULOSKELETAL - ADULT  Goal: Maintain or return mobility to safest level of function  INTERVENTIONS:  - Assess patient's ability to carry out ADLs; assess patient's baseline for ADL function and identify physical deficits which impact ability to perform ADLs (bathing, care of mouth/teeth, toileting, grooming, dressing, etc )  - Assess/evaluate cause of self-care deficits   - Assess range of motion  - Assess patient's mobility; develop plan if impaired  - Assess patient's need for assistive devices and provide as appropriate  - Encourage maximum independence but intervene and supervise when necessary  - Involve family in performance of ADLs  - Assess for home care needs following discharge   - Request OT consult to assist with ADL evaluation and planning for discharge  - Provide patient education as appropriate   Outcome: Progressing      Problem: DISCHARGE PLANNING - CARE MANAGEMENT  Goal: Discharge to post-acute care or home with appropriate resources  INTERVENTIONS:  - Conduct assessment to determine patient/family and health care team treatment goals, and need for post-acute services based on payer coverage, community resources, and patient preferences, and barriers to discharge  - Address psychosocial, clinical, and financial barriers to discharge as identified in assessment in conjunction with the patient/family and health care team  - Arrange appropriate level of post-acute services according to patients   needs and preference and payer coverage in collaboration with the physician and health care team  - Communicate with and update the patient/family, physician, and health care team regarding progress on the discharge plan  - Arrange appropriate transportation to post-acute venues   Outcome: Progressing

## 2018-08-07 NOTE — NURSING NOTE
Received patient at 3 pm  Patient was OOB in his chair  Adjusted patient several times to make him comfortable  Patient was alert and pleasant   Agree with previous nurses assessment and will continue to monitor

## 2018-08-07 NOTE — PHYSICAL THERAPY NOTE
Physical Therapy Progress Note     08/07/18 0950   Pain Assessment   Pain Assessment No/denies pain   Pain Score No Pain   Restrictions/Precautions   Weight Bearing Precautions Per Order No   Other Precautions Fall Risk;Telemetry;Multiple lines;O2;Chair Alarm; Bed Alarm;Hard of hearing;Cognitive   General   Chart Reviewed Yes   Response to Previous Treatment Patient unable to report, no changes reported from family or staff   Family/Caregiver Present No   Subjective   Subjective "I'll get up for breakfast "   Bed Mobility   Supine to Sit 4  Minimal assistance   Additional items Assist x 2;Bedrails;HOB elevated; Increased time required;LE management;Verbal cues   Transfers   Sit to Stand 3  Moderate assistance   Additional items Assist x 1;Bedrails; Increased time required;Verbal cues  (second present for safety)   Stand to Sit 3  Moderate assistance   Additional items Assist x 2;Armrests; Increased time required;Verbal cues   Ambulation/Elevation   Gait pattern Decreased foot clearance; Forward Flexion;Narrow VENTURA; Short stride; Step to;Excessively slow   Gait Assistance 3  Moderate assist   Additional items Assist x 1;Verbal cues; Tactile cues  (second present for safety)   Assistive Device Rolling walker   Distance 15'   Balance   Static Sitting Fair   Dynamic Sitting Fair -   Static Standing Poor +   Dynamic Standing Poor   Ambulatory Poor   Endurance Deficit   Endurance Deficit Yes   Endurance Deficit Description fatigue   Activity Tolerance   Activity Tolerance Patient limited by fatigue;Treatment limited secondary to agitation   Nurse Made Aware Yes   Assessment   Prognosis Fair   Problem List Decreased strength;Decreased range of motion;Decreased endurance; Impaired balance;Decreased mobility; Impaired judgement;Decreased cognition;Decreased safety awareness; Impaired hearing;Decreased skin integrity   Assessment Pt  supine in bed upon my arrival  Pt  remains agitated towards therapist when asking questions and attempting to perform mobility with pt  Progressed with transfers requiring A of 2 with cues provided for proper technique/hand placement  Progressed with a limited amb  trial with use of RW and modA of therapist with cues provided for LE sequencing  Pt  positioned seated in bedside chair with alarm active at end of treatment session  PT will continue to recommend rehab upon d/c for continued improvement of noted impairments above  Barriers to Discharge Decreased caregiver support   Barriers to Discharge Comments Pt  home alone  Goals   Patient Goals Unable to report goal, secondary to continued agitation of pt  STG Expiration Date 08/16/18   Treatment Day 1   Plan   Treatment/Interventions LE strengthening/ROM; Functional transfer training; Endurance training;Gait training;Bed mobility;Spoke to nursing;Spoke to case management;OT   Progress Slow progress, cognitive deficits   PT Frequency Other (Comment)  (4-5x/wk)   Recommendation   Recommendation Other (Comment)  (rehab)   Equipment Recommended Walker  (RW)   PT - OK to Discharge Yes  (if d/c to rehab when medically stable )     Argentina Weaver, PTA

## 2018-08-07 NOTE — PROGRESS NOTES
Progress Note - Pulmonary   My Ha  80 y o  male MRN: [id number]  Unit/Bed#: E4 -01 Encounter: [id number]      Assessment:  1  Acute/chronic diastolic congestive heart failure  2   Asked to re-evaluate patient secondary to Hypoxic/hypercapnic respiratory failure  3  CAD-patient did present with chest pain and positive troponin which was most likely secondary to non STEMI    Plan:  1  Diamox 250 mg IV q 6 times 12 doses ordered  2   BiPAP 12/5 q h s  3   Titrate oxygen to maintain O2 saturation greater than 90%  4   Diuresis per Cardiology/medicine    Subjective:   Patient out of bed sitting in chair  Nursing she the patient  No apparent distress however seems a little more confused compared to my initial evaluation on August 2, 2018  Patient denies shortness of breath, cough, chest pain  Objective:     Vitals: Blood pressure 102/78, pulse 82, temperature (!) 97 4 °F (36 3 °C), temperature source Temporal, resp  rate 18, height 5' 5" (1 651 m), weight 53 7 kg (118 lb 6 2 oz), SpO2 97 %  , 3 L nasal cannula, Body mass index is 19 7 kg/m²  Intake/Output Summary (Last 24 hours) at 08/07/18 1234  Last data filed at 08/07/18 0245   Gross per 24 hour   Intake                0 ml   Output              590 ml   Net             -590 ml         Physical Exam  Gen: Awake, seems mildly confused compared to my initial evaluation last week  no acute distress  HEENT: Mucous membranes moist, no oral lesions, no thrush  NECK: No accessory muscle use,  Cardiac: Regular, single S1, single S2, no murmurs  Lungs:  Decreased breath sounds bases  Abdomen: normoactive bowel sounds, soft nontender, nondistended, no rebound or rigidity, no guarding  Extremities: no cyanosis, no clubbing, no edema, no calf pain with palpation    Labs: I have personally reviewed pertinent lab results  , ABG:   Lab Results   Component Value Date    PHART 7 403 08/07/2018    SJE5CJR 87 0 (HH) 08/07/2018    PO2ART 47 9 (LL) 08/07/2018    HNZ8GGO 53 0 (H) 08/07/2018    BEART 22 8 08/07/2018    SOURCE Radial, Right 08/07/2018   , BNP: No results found for: BNP, CBC:   Lab Results   Component Value Date    WBC 7 16 08/07/2018    HGB 12 2 08/07/2018    HCT 41 6 08/07/2018     (H) 08/07/2018     08/07/2018    MCH 29 5 08/07/2018    MCHC 29 3 (L) 08/07/2018    RDW 14 9 08/07/2018    MPV 9 4 08/07/2018   , CMP:   Lab Results   Component Value Date     08/07/2018    K 4 2 08/07/2018    CL 99 (L) 08/07/2018    CO2 44 (H) 08/07/2018    ANIONGAP -6 (L) 08/07/2018    BUN 25 08/07/2018    CREATININE 0 63 08/07/2018    GLUCOSE 69 08/07/2018    CALCIUM 8 5 08/07/2018    EGFR 82 08/07/2018   , PT/INR: No results found for: PT, INR, Troponin: No results found for: TROPONINI  Imaging and other studies: I have personally reviewed pertinent reports     and I have personally reviewed pertinent films in PACS      Megha Alvarez PA-C

## 2018-08-07 NOTE — PLAN OF CARE
Problem: PHYSICAL THERAPY ADULT  Goal: Performs mobility at highest level of function for planned discharge setting  See evaluation for individualized goals  Treatment/Interventions: Functional transfer training, LE strengthening/ROM, Therapeutic exercise, Endurance training, Patient/family training, Bed mobility, Gait training, Spoke to nursing  Equipment Recommended: Nina De Leon       See flowsheet documentation for full assessment, interventions and recommendations  Outcome: Progressing  Prognosis: Fair  Problem List: Decreased strength, Decreased range of motion, Decreased endurance, Impaired balance, Decreased mobility, Impaired judgement, Decreased cognition, Decreased safety awareness, Impaired hearing, Decreased skin integrity  Assessment: Pt  supine in bed upon my arrival  Pt  remains agitated towards therapist when asking questions and attempting to perform mobility with pt  Progressed with transfers requiring A of 2 with cues provided for proper technique/hand placement  Progressed with a limited amb  trial with use of RW and modA of therapist with cues provided for LE sequencing  Pt  positioned seated in bedside chair with alarm active at end of treatment session  PT will continue to recommend rehab upon d/c for continued improvement of noted impairments above  Barriers to Discharge: Decreased caregiver support  Barriers to Discharge Comments: Pt  home alone  Recommendation: Other (Comment) (rehab)     PT - OK to Discharge: Yes (if d/c to rehab when medically stable )    See flowsheet documentation for full assessment

## 2018-08-07 NOTE — CASE MANAGEMENT
Continued Stay Review    Date:  8/7/2018    Vital Signs: /65 (BP Location: Left arm)   Pulse 59   Temp (!) 97 4 °F (36 3 °C) (Temporal)   Resp 18   Ht 5' 5" (1 651 m)   Wt 53 7 kg (118 lb 6 2 oz)   SpO2 96%   BMI 19 70 kg/m²       NEW ORDERS:    LE venous duplex  VQ Scan  ABG  Consult Pulm  Diamox  250 mg IV q6h  BiPap @ hs    Medications:   Scheduled Meds:   Current Facility-Administered Medications:         IV syringe builder  Intravenous Q6H Masha Harden PA-C   aspirin 81 mg Oral Daily Philip Mckeon MD   atorvastatin 40 mg Oral Daily With Kameron Paez MD          chlorhexidine 15 mL Swish & Spit Q12H [facility] Foster Schwarz MD          ferrous sulfate 325 mg Oral BID Foster Schwarz MD   finasteride 5 mg Oral Daily Foster Schwarz MD   fluticasone 1 spray Nasal BID Foster Schwarz MD   furosemide 40 mg Intravenous Daily Foster Schwarz MD   heparin (porcine) 5,000 Units Subcutaneous [facility] Foster Schwarz MD   latanoprost 1 drop Left Eye Daily Foster Schwarz MD   pantoprazole 20 mg Oral Early Morning Foster Schwarz MD   timolol 1 drop Left Eye Daily Foster Schwarz MD     Continuous Infusions:    PRN Meds:   acetaminophen    bisacodyl    docusate sodium    Abnormal Labs/Diagnostic Results:  NTproBNP 5,533,   CL 99,   CO2  44  ABG:  3 liters NC: 7 403 / 87 / 47 9 / 83 5%   Bicarb 53 0    Age/Sex: 80 y o  male  seems a little more confused     Assessment/Plan:      Per Cardio:   Assessment:   1  Acute hypoxic respiratory failure possibly secondary to diastolic heart failure  2  Coronary disease status post remote myocardial infarction and possible stenting  3  Probable contraction alkalosis  4  Hypertension  5  Dyslipidemia     Discussion/Recommendations:  D-dimer elevated but reportedly negative for his age  Would consider V/Q scans and lower extremity Dopplers to more officially rule out any pulmonary embolus    Will check ABG and consider Diamox as has significantly elevated bicarbonate  Per PULM:   Denies shortness of breath  Denies chest pain  His arterial blood gas showed chronic hypercapnic and hypoxic respiratory failure  He has very well compensated chronic severe respiratory acidosis  Plan:  1  Diamox 250 mg IV q 6 times 12 doses ordered  2   BiPAP 12/5 q h s  3   Titrate oxygen to maintain O2 saturation greater than 90%  4   Diuresis per Cardiology/medicine     Discharge Plan: To BE determined    Thank you,  [facility]  [address] Utilization Review Department  Phone: [phone]; Fax [phone]  ATTENTION: The Network Utilization Review Department is now centralized for our 9 Facilities  Make a note that we have a new phone and fax numbers for our Department  Please call with any questions or concerns to [phone] and carefully follow the prompts so that you are directed to the right person  All voicemails are confidential  Fax any determinations, approvals, denials, and requests for initial or continue stay review clinical to [phone]  Due to HIGH CALL volume, it would be easier if you could please send faxed requests to expedite your requests and in part, help us provide discharge notifications faster

## 2018-08-08 LAB
ANION GAP SERPL CALCULATED.3IONS-SCNC: -3 MMOL/L (ref 4–13)
BUN SERPL-MCNC: 25 MG/DL (ref 5–25)
CALCIUM SERPL-MCNC: 8.7 MG/DL (ref 8.3–10.1)
CHLORIDE SERPL-SCNC: 101 MMOL/L (ref 100–108)
CO2 SERPL-SCNC: 44 MMOL/L (ref 21–32)
CREAT SERPL-MCNC: 0.64 MG/DL (ref 0.6–1.3)
GFR SERPL CREATININE-BSD FRML MDRD: 81 ML/MIN/1.73SQ M
GLUCOSE SERPL-MCNC: 105 MG/DL (ref 65–140)
POTASSIUM SERPL-SCNC: 3.6 MMOL/L (ref 3.5–5.3)
SODIUM SERPL-SCNC: 142 MMOL/L (ref 136–145)
TROPONIN I SERPL-MCNC: 0.04 NG/ML
TROPONIN I SERPL-MCNC: 0.09 NG/ML
TROPONIN I SERPL-MCNC: 0.12 NG/ML

## 2018-08-08 PROCEDURE — 80048 BASIC METABOLIC PNL TOTAL CA: CPT | Performed by: INTERNAL MEDICINE

## 2018-08-08 PROCEDURE — 99232 SBSQ HOSP IP/OBS MODERATE 35: CPT | Performed by: INTERNAL MEDICINE

## 2018-08-08 PROCEDURE — 94660 CPAP INITIATION&MGMT: CPT

## 2018-08-08 PROCEDURE — 93005 ELECTROCARDIOGRAM TRACING: CPT

## 2018-08-08 PROCEDURE — 84484 ASSAY OF TROPONIN QUANT: CPT | Performed by: INTERNAL MEDICINE

## 2018-08-08 RX ORDER — NITROGLYCERIN 0.4 MG/1
0.4 TABLET SUBLINGUAL
Status: DISCONTINUED | OUTPATIENT
Start: 2018-08-08 | End: 2018-08-10 | Stop reason: HOSPADM

## 2018-08-08 RX ADMIN — CHLORHEXIDINE GLUCONATE 15 ML: 1.2 RINSE ORAL at 08:23

## 2018-08-08 RX ADMIN — ASPIRIN 81 MG: 81 TABLET, COATED ORAL at 08:23

## 2018-08-08 RX ADMIN — ACETAZOLAMIDE: 500 INJECTION, POWDER, LYOPHILIZED, FOR SOLUTION INTRAVENOUS at 12:45

## 2018-08-08 RX ADMIN — TIMOLOL MALEATE 1 DROP: 2.5 SOLUTION OPHTHALMIC at 08:24

## 2018-08-08 RX ADMIN — CHLORHEXIDINE GLUCONATE 15 ML: 1.2 RINSE ORAL at 21:19

## 2018-08-08 RX ADMIN — HEPARIN SODIUM 5000 UNITS: 5000 INJECTION, SOLUTION INTRAVENOUS; SUBCUTANEOUS at 18:15

## 2018-08-08 RX ADMIN — FLUTICASONE PROPIONATE 1 SPRAY: 50 SPRAY, METERED NASAL at 08:24

## 2018-08-08 RX ADMIN — FINASTERIDE 5 MG: 5 TABLET, FILM COATED ORAL at 08:23

## 2018-08-08 RX ADMIN — FUROSEMIDE 40 MG: 10 INJECTION, SOLUTION INTRAMUSCULAR; INTRAVENOUS at 08:24

## 2018-08-08 RX ADMIN — FLUTICASONE PROPIONATE 1 SPRAY: 50 SPRAY, METERED NASAL at 18:15

## 2018-08-08 RX ADMIN — Medication 325 MG: at 18:15

## 2018-08-08 RX ADMIN — LATANOPROST 1 DROP: 50 SOLUTION OPHTHALMIC at 08:24

## 2018-08-08 RX ADMIN — PANTOPRAZOLE SODIUM 20 MG: 20 TABLET, DELAYED RELEASE ORAL at 06:08

## 2018-08-08 RX ADMIN — Medication 325 MG: at 08:23

## 2018-08-08 RX ADMIN — ACETAZOLAMIDE: 500 INJECTION, POWDER, LYOPHILIZED, FOR SOLUTION INTRAVENOUS at 06:08

## 2018-08-08 RX ADMIN — HEPARIN SODIUM 5000 UNITS: 5000 INJECTION, SOLUTION INTRAVENOUS; SUBCUTANEOUS at 21:19

## 2018-08-08 RX ADMIN — ATORVASTATIN CALCIUM 40 MG: 40 TABLET, FILM COATED ORAL at 18:15

## 2018-08-08 RX ADMIN — HEPARIN SODIUM 5000 UNITS: 5000 INJECTION, SOLUTION INTRAVENOUS; SUBCUTANEOUS at 06:08

## 2018-08-08 RX ADMIN — ACETAZOLAMIDE: 500 INJECTION, POWDER, LYOPHILIZED, FOR SOLUTION INTRAVENOUS at 18:38

## 2018-08-08 NOTE — NURSING NOTE
Patient was sitting on recliner and started screaming out "I need help " Patient c/o of "heart pain it's an 8 " Patient was assisted back in bed, VS done and documented  Kristina Rubi was called and informed  EKG, nitro and troponin's ordered  By the time Dr Whipple walked in room patient did not have any more chest pain  Nitro sub lingual on hold  EKG done, first troponin collected

## 2018-08-08 NOTE — PHYSICAL THERAPY NOTE
Physical Therapy Cancellation Note  Attempted to see pt  This PM for therapeutic intervention  However, currently refusing at this time  Attempted to explain to pt  Importance of continued therapeutic intervention  However, pt  Continued to refuse  Will cancel for today and continue to follow as appropriate       Dave Cavanaugh PTA

## 2018-08-08 NOTE — PLAN OF CARE
Problem: DISCHARGE PLANNING - CARE MANAGEMENT  Goal: Discharge to post-acute care or home with appropriate resources  INTERVENTIONS:  - Conduct assessment to determine patient/family and health care team treatment goals, and need for post-acute services based on payer coverage, community resources, and patient preferences, and barriers to discharge  - Address psychosocial, clinical, and financial barriers to discharge as identified in assessment in conjunction with the patient/family and health care team  - Arrange appropriate level of post-acute services according to patients   needs and preference and payer coverage in collaboration with the physician and health care team  - Communicate with and update the patient/family, physician, and health care team regarding progress on the discharge plan  - Arrange appropriate transportation to post-acute venues   Outcome: [address] can accept pt when medical cleared  Need to get auth from the [facility]

## 2018-08-08 NOTE — PLAN OF CARE
Problem: Potential for Falls  Goal: Patient will remain free of falls  INTERVENTIONS:  - Assess patient frequently for physical needs  -  Identify cognitive and physical deficits and behaviors that affect risk of falls  -  Galt fall precautions as indicated by assessment   - Educate patient/family on patient safety including physical limitations  - Instruct patient to call for assistance with activity based on assessment  - Modify environment to reduce risk of injury  - Consider OT/PT consult to assist with strengthening/mobility   Outcome: Progressing      Problem: Prexisting or High Potential for Compromised Skin Integrity  Goal: Skin integrity is maintained or improved  INTERVENTIONS:  - Identify patients at risk for skin breakdown  - Assess and monitor skin integrity  - Assess and monitor nutrition and hydration status  - Monitor labs (i e  albumin)  - Assess for incontinence   - Turn and reposition patient  - Assist with mobility/ambulation  - Relieve pressure over bony prominences  - Avoid friction and shearing  - Provide appropriate hygiene as needed including keeping skin clean and dry  - Evaluate need for skin moisturizer/barrier cream  - Collaborate with interdisciplinary team (i e  Nutrition, Rehabilitation, etc )   - Patient/family teaching   Outcome: Progressing      Problem: Nutrition/Hydration-ADULT  Goal: Nutrient/Hydration intake appropriate for improving, restoring or maintaining nutritional needs  Monitor and assess patient's nutrition/hydration status for malnutrition (ex- brittle hair, bruises, dry skin, pale skin and conjunctiva, muscle wasting, smooth red tongue, and disorientation)  Collaborate with interdisciplinary team and initiate plan and interventions as ordered  Monitor patient's weight and dietary intake as ordered or per policy  Utilize nutrition screening tool and intervene per policy   Determine patient's food preferences and provide high-protein, high-caloric foods as appropriate       INTERVENTIONS:  - Monitor oral intake, urinary output, labs, and treatment plans  - Assess nutrition and hydration status and recommend course of action  - Evaluate amount of meals eaten  - Assist patient with eating if necessary   - Allow adequate time for meals  - Recommend/ encourage appropriate diets, oral nutritional supplements, and vitamin/mineral supplements  - Order, calculate, and assess calorie counts as needed  - Recommend, monitor, and adjust tube feedings and TPN/PPN based on assessed needs  - Assess need for intravenous fluids  - Provide specific nutrition/hydration education as appropriate  - Include patient/family/caregiver in decisions related to nutrition   Outcome: Progressing      Problem: RESPIRATORY - ADULT  Goal: Achieves optimal ventilation and oxygenation  INTERVENTIONS:  - Assess for changes in respiratory status  - Assess for changes in mentation and behavior  - Position to facilitate oxygenation and minimize respiratory effort  - Oxygen administration by appropriate delivery method based on oxygen saturation (per order) or ABGs  - Initiate smoking cessation education as indicated  - Encourage broncho-pulmonary hygiene including cough, deep breathe, Incentive Spirometry  - Assess the need for suctioning and aspirate as needed  - Assess and instruct to report SOB or any respiratory difficulty  - Respiratory Therapy support as indicated   Outcome: Progressing      Problem: METABOLIC, FLUID AND ELECTROLYTES - ADULT  Goal: Electrolytes maintained within normal limits  INTERVENTIONS:  - Monitor labs and assess patient for signs and symptoms of electrolyte imbalances  - Administer electrolyte replacement as ordered  - Monitor response to electrolyte replacements, including repeat lab results as appropriate  - Instruct patient on fluid and nutrition as appropriate   Outcome: Progressing    Goal: Fluid balance maintained  INTERVENTIONS:  - Monitor labs and assess for signs and symptoms of volume excess or deficit  - Monitor I/O and WT  - Instruct patient on fluid and nutrition as appropriate   Outcome: Progressing      Problem: SKIN/TISSUE INTEGRITY - ADULT  Goal: Skin integrity remains intact  INTERVENTIONS  - Identify patients at risk for skin breakdown  - Assess and monitor skin integrity  - Assess and monitor nutrition and hydration status  - Monitor labs (i e  albumin)  - Assess for incontinence   - Turn and reposition patient  - Assist with mobility/ambulation  - Relieve pressure over bony prominences  - Avoid friction and shearing  - Provide appropriate hygiene as needed including keeping skin clean and dry  - Evaluate need for skin moisturizer/barrier cream  - Collaborate with interdisciplinary team (i e  Nutrition, Rehabilitation, etc )   - Patient/family teaching   Outcome: Progressing      Problem: MUSCULOSKELETAL - ADULT  Goal: Maintain or return mobility to safest level of function  INTERVENTIONS:  - Assess patient's ability to carry out ADLs; assess patient's baseline for ADL function and identify physical deficits which impact ability to perform ADLs (bathing, care of mouth/teeth, toileting, grooming, dressing, etc )  - Assess/evaluate cause of self-care deficits   - Assess range of motion  - Assess patient's mobility; develop plan if impaired  - Assess patient's need for assistive devices and provide as appropriate  - Encourage maximum independence but intervene and supervise when necessary  - Involve family in performance of ADLs  - Assess for home care needs following discharge   - Request OT consult to assist with ADL evaluation and planning for discharge  - Provide patient education as appropriate   Outcome: Progressing      Problem: DISCHARGE PLANNING - CARE MANAGEMENT  Goal: Discharge to post-acute care or home with appropriate resources  INTERVENTIONS:  - Conduct assessment to determine patient/family and health care team treatment goals, and need for post-acute services based on payer coverage, community resources, and patient preferences, and barriers to discharge  - Address psychosocial, clinical, and financial barriers to discharge as identified in assessment in conjunction with the patient/family and health care team  - Arrange appropriate level of post-acute services according to patients   needs and preference and payer coverage in collaboration with the physician and health care team  - Communicate with and update the patient/family, physician, and health care team regarding progress on the discharge plan  - Arrange appropriate transportation to post-acute venues   Outcome: Progressing

## 2018-08-08 NOTE — PROGRESS NOTES
Progress Note - Cardiology   Tang Knox  80 y o  male MRN: [id number]  Unit/Bed#: E4 -01 Encounter: [id number]      Assessment:     1  Acute hypoxic/hypercapnic respiratory failure likely secondary to diastolic heart failure  2  Coronary disease status post remote myocardial infarction and possible stenting  3  Probable contraction alkalosis  4  Hypertension  5  Dyslipidemia    Discussion/Recommendations: Will conclude surgery euvolemic but still using oxygen  Would continue current diuretic dose-daily Lasix and Diamox  On aspirin/statin      Subjective:  Offers no complaints      Physical Exam:  GEN:  NAD  HEENT:  MMM, NCAT, pink conjunctiva, EOMI, nonicteric sclera  CV:  NO JVD/HJR, RR, NO M/R/G, +S1/S2, NO PARASTERNAL HEAVE/THRILL, NO LE EDEMA, NO HEPATIC SYSTOLIC PULSATION, WARM EXTREMITIES  RESP:  CTAB/L  ABD:  SOFT, NT, NO GROSS ORGANOMEGALY        Vitals:   /66 (BP Location: Right arm)   Pulse 78   Temp 97 9 °F (36 6 °C) (Temporal)   Resp 20   Ht 5' 5" (1 651 m)   Wt 53 kg (116 lb 13 5 oz)   SpO2 93%   BMI 19 44 kg/m²   Vitals:    08/07/18 0530 08/08/18 0344   Weight: 53 7 kg (118 lb 6 2 oz) 53 kg (116 lb 13 5 oz)       Intake/Output Summary (Last 24 hours) at 08/08/18 0946  Last data filed at 08/08/18 0040   Gross per 24 hour   Intake              240 ml   Output             1050 ml   Net             -810 ml     Lab Results:    Results from last 7 days  Lab Units 08/07/18  0549   WBC Thousand/uL 7 16   HEMOGLOBIN g/dL 12 2   HEMATOCRIT % 41 6   PLATELETS Thousands/uL 165       Results from last 7 days  Lab Units 08/08/18  0429  08/02/18  1052   SODIUM mmol/L 142  < > 142   POTASSIUM mmol/L 3 6  < > 5 0   CHLORIDE mmol/L 101  < > 102   CO2 mmol/L 44*  < > 37*   BUN mg/dL 25  < > 29*   CREATININE mg/dL 0 64  < > 1 24   CALCIUM mg/dL 8 7  < > 8 8   TOTAL PROTEIN g/dL  --   --  6 7   BILIRUBIN TOTAL mg/dL  --   --  0 51   ALK PHOS U/L  --   --  77   ALT U/L  --   --  41 AST U/L  --   --  38   GLUCOSE RANDOM mg/dL 105  < > 114   < > = values in this interval not displayed      Results from last 7 days  Lab Units 08/08/18  0429   SODIUM mmol/L 142   POTASSIUM mmol/L 3 6   CHLORIDE mmol/L 101   CO2 mmol/L 44*   BUN mg/dL 25   CREATININE mg/dL 0 64   GLUCOSE RANDOM mg/dL 105   CALCIUM mg/dL 8 7             Medications:    Current Facility-Administered Medications:     acetaminophen (TYLENOL) tablet 650 mg, 650 mg, Oral, Q6H PRN, Mary Cortez MD, 650 mg at 08/05/18 2141    acetaZOLAMIDE (DIAMOX) 250 mg in sterile water 2 5 mL IV syringe, , Intravenous, Q6H, Martinez Flores PA-C    aspirin (ECOTRIN LOW STRENGTH) EC tablet 81 mg, 81 mg, Oral, Daily, Esperanza Perdomo MD, 81 mg at 08/08/18 9401    atorvastatin (LIPITOR) tablet 40 mg, 40 mg, Oral, Daily With Lily Olivarez MD, 40 mg at 08/07/18 1744    bisacodyl (DULCOLAX) EC tablet 5 mg, 5 mg, Oral, Daily PRN, Mary Cortez MD    chlorhexidine (PERIDEX) 0 12 % oral rinse 15 mL, 15 mL, Swish & Spit, Q12H [address], Mary Cortez MD, 15 mL at 08/08/18 1090    docusate sodium (COLACE) capsule 100 mg, 100 mg, Oral, Daily PRN, Mary Cortez MD    ferrous sulfate tablet 325 mg, 325 mg, Oral, BID, Mary Cortez MD, 325 mg at 08/08/18 3884    finasteride (PROSCAR) tablet 5 mg, 5 mg, Oral, Daily, Mary Cortez MD, 5 mg at 08/08/18 0823    fluticasone (FLONASE) 50 mcg/act nasal spray 1 spray, 1 spray, Nasal, BID, Mary Cortez MD, 1 spray at 08/08/18 0824    furosemide (LASIX) injection 40 mg, 40 mg, Intravenous, Daily, Mary Cortez MD, 40 mg at 08/08/18 8423    heparin (porcine) subcutaneous injection 5,000 Units, 5,000 Units, Subcutaneous, Q8H [address], 5,000 Units at 08/08/18 0608 **AND** Platelet count, , , Once, Mary Cortez MD    latanoprost (XALATAN) 0 005 % ophthalmic solution 1 drop, 1 drop, Left Eye, Daily, Mary Cortez MD, 1 drop at 08/08/18 0824    pantoprazole (PROTONIX) EC tablet 20 mg, 20 mg, Oral, Early Morning, Edelmira Meyer MD, 20 mg at 08/08/18 0608    timolol (TIMOPTIC) 0 25 % ophthalmic solution 1 drop, 1 drop, Left Eye, Daily, Edelmira Meyer MD, 1 drop at 08/08/18 5980    Portions of the record may have been created with voice recognition software  Occasional wrong word or "sound a like" substitutions may have occurred due to the inherent limitations of voice recognition software  Read the chart carefully and recognize, using context, where substitutions have occurred

## 2018-08-08 NOTE — OCCUPATIONAL THERAPY NOTE
Occupational Therapy  Pt adamantly refused tx session stating, "I don't want anything like that"  Will continue to follow as receptive   ADOLPH Sanders

## 2018-08-08 NOTE — PROGRESS NOTES
Progress Note - Pulmonary   Annabelle Hrisch  80 y o  male MRN: [id number]  Unit/Bed#: E4 -01 Encounter: [id number]      Assessment:  1  Chronic hypoxic/hypercapnic respiratory failure likely secondary to diastolic heart failure  Serum bicarb 44    Plan:  1  Continue Diamox 250 mg IV q 6 times 12 doses  2   BiPAP q h s  as tolerated  3  Titrate oxygen to maintain O2 saturation greater than 90%  4   Diuresis per Cardiology/medicine    Subjective:   Mr Sharifa Enriquez is out of bed sleeping in chair  Patient is arousable and awakes when spoken to  No apparent distress  2 L nasal cannula in place  Objective:     Vitals: Blood pressure 95/54, pulse 64, temperature 97 9 °F (36 6 °C), temperature source Temporal, resp  rate 20, height 5' 5" (1 651 m), weight 53 kg (116 lb 13 5 oz), SpO2 93 %  , 2 L nasal cannula, Body mass index is 19 44 kg/m²  Intake/Output Summary (Last 24 hours) at 08/08/18 1250  Last data filed at 08/08/18 0040   Gross per 24 hour   Intake              240 ml   Output             1050 ml   Net             -810 ml         Physical Exam  Gen: Awake, seems tired and mildly confused   no acute distress  HEENT: Mucous membranes moist, no oral lesions, no thrush  NECK: No accessory muscle use, JVP not elevated  Cardiac: Regular, single S1, single S2, no murmurs, no rubs, no gallops  Lungs:  Decreased breath sounds bases  Abdomen: normoactive bowel sounds, soft nontender, nondistended, no rebound or rigidity, no guarding  Extremities: no cyanosis, no clubbing, no edema    Labs: I have personally reviewed pertinent lab results  Imaging and other studies: I have personally reviewed pertinent reports     and I have personally reviewed pertinent films in PACS      Yeimi Mcneill PA-C

## 2018-08-08 NOTE — CASE MANAGEMENT
Continued Stay Review    Date:  8/8/2018    Vital Signs: BP 95/54 (BP Location: Left arm)   Pulse 64   Temp 97 9 °F (36 6 °C) (Temporal)   Resp 20   Ht 5' 5" (1 651 m)   Wt 53 kg (116 lb 13 5 oz)   SpO2 93%   BMI 19 44 kg/m²     Medications:   Scheduled Meds:   Current Facility-Administered Medications:         Diamox  250 mg  Intravenous Q6H    aspirin 81 mg Oral Daily    atorvastatin 40 mg Oral Daily With Dinner           chlorhexidine 15 mL Swish & Spit Q12H [address]           ferrous sulfate 325 mg Oral BID    finasteride 5 mg Oral Daily    fluticasone 1 spray Nasal BID    furosemide 40 mg Intravenous Daily    heparin (porcine) 5,000 Units Subcutaneous Q8H [address]    latanoprost 1 drop Left Eye Daily    pantoprazole 20 mg Oral Early Morning    timolol 1 drop Left Eye Daily      Continuous Infusions:    PRN Meds:   acetaminophen    bisacodyl    docusate sodium    Abnormal Labs/Diagnostic Results:  CO2  44    Age/Sex: 80 y o  male     Assessment/Plan:   Per Cardio Note:    1   Acute hypoxic/hypercapnic respiratory failure likely secondary to diastolic heart failure  2   Coronary disease status post remote myocardial infarction and possible stenting  3   Probable contraction alkalosis  4   Hypertension  5   Dyslipidemia     Discussion/Recommendations:  Would continue current diuretic dose-daily Lasix and Diamox  On aspirin/statin  Continue Diamox 250 mg IV q 6 times 12 doses  Discharge Plan: [address] when medically cleared      Thank you,  Taqueria Aqq  [facility] Utilization Review Department  Phone: [phone]; Fax [phone]  ATTENTION: The Network Utilization Review Department is now centralized for our 9 Facilities  Make a note that we have a new phone and fax numbers for our Department  Please call with any questions or concerns to [phone] and carefully follow the prompts so that you are directed to the right person   All voicemails are confidential  Fax any determinations, approvals, denials, and requests for initial or continue stay review clinical to [phone]  Due to HIGH CALL volume, it would be easier if you could please send faxed requests to expedite your requests and in part, help us provide discharge notifications faster

## 2018-08-08 NOTE — PROGRESS NOTES
Progress Note - Deepti Collier  80 y o  male MRN: [id number]    Unit/Bed#: E4 -01 Encounter: [id number]      Assessment/Plan:  1-acute/chronic diastolic congestive heart failure exacerbation:   patient presented with evidence of volume overload and acute/chronic diastolic congestive heart failure exacerbation  -he continues to gradually improve              -his weight has decreased 12 kg since admission              -Pro BNP improved from 19,000 to 5,000              -Continue IV diuresis              -currently on Lasix 40 mg IV daily              -monitor daily weights and creatinine              -echocardiogram from 08/03 with left ventricular ejection fraction 75%   No regional wall motion abnormalities   Wall thickness mildly-moderately increased   Right ventricle mildly-moderately dilated      2-essential hypertension:  Blood pressure currently adequately controlled               -blood pressure today was 149/66  Currently 95/54 during current episode of chest pain               -Continue to hold ACE-inhibitor due to prior hyperkalemia                  3-hyperlipidemia:  Continue statin     4-status post hyperkalemia:  ACE-inhibitor on hold  Mashamargie Brooks resolved      5-coronary artery disease:  With history of previous MRI   Patient presented with chest pain and a positive troponin   He was evaluated by the cardiology service   His positive troponin was felt to be most likely secondary to a non STEMI type 2, secondary to his congestive heart failure   Further ischemic testing was not recommended by Cardiology at this time               -continue medical therapy with aspirin, statin               -no beta-blocker due to bradycardia   -the patient had a current episode of chest pain that would spontaneously resolved prior to him receiving any nitroglycerin  As his pain had been resolving he refused nitroglycerin  He is currently sleeping comfortably    EKG is currently in atrial fibrillation with Q-wave in lead V 3 and AVF with chart new, as well as T-wave inversions in lead 3, AVF, V1-V5  Reviewed with cardiology team   Continue medical therapy      6-acute hypoxic/hypercapnic respiratory failure:  Patient presented to the ER in respiratory distress with hypoxia/hypercapnia   He initially was admitted to the step-down unit and required BiPAP              -continue to monitor and titrate off oxygen as tolerated              -will need home O2 eval prior to discharge  -V/Q scan low probability for pulmonary embolism              -pulmonary service notes the patient has chronic, severe, respiratory acidosis  Possibly due to restrictive lung disease or sleep apnea              -no history of COPD upon review of his old charts   -pt did not tolerate bipap last evening   -sats adequate on 2 5L nc      7-PAF:  EKG revealed atrial fibrillation during the time of chest pain  Patient has a prior history of sinus bradycardia, it would not tolerate beta-blockers              -continue aspirin      8-ambulatory dysfunction:  Continue PT  Yoly Gutierrez recommend inpatient short-term rehab   is following   -patient's he adamantly refuses rehab and wishes to go home     9-BPH:  Without current symptoms   Continue to monitor     10-chronic anemia:  Patient is on iron as an outpatient     11-chronic, severe, respiratory acidosis:  Patient's ABG reveals a normal pH, and patient is compensating for chronic severe respiratory acidosis, with compensatory metabolic alkalosis  He was evaluated by the pulmonary service  He was started on Diamox  BiPAP is ordered at bedtime-however patient did not tolerate    Continue to monitor     12- family:  Called granddaughter Danilo Calderón and LM to call my cell number for update      Discussed with patient's nurse  Discussed with cardiologist Dr Meliza Wilcox  D/w Dr Reggie Arevalo- will also consult palliative care:  Pt notes he is tired and wishes to be dc home       VTE Pharmacologic Prophylaxis: Heparin  VTE Mechanical Prophylaxis: sequential compression device      Certification Statement: The patient will continue to require additional inpatient hospital stay due to need for further acute intervention for respiratory acidosis, chf    Status: inpatient     ===================================================================    Subjective:  Called to see patient for chest pain  Patient indicates he developed left anterior chest pain  He notes there was no radiation  He denies any associated shortness of breath or nausea or diaphoresis  He relates that is similar to the chest pain that he gets at home  Patient relates he typically gets pain in the middle of the night that awakens him from sleep  He takes 1 nitroglycerin sublingually which usually alleviates his pain  Patient notes during our exam is pain is currently improving  He is currently refusing nitroglycerin as he relates that his pain is almost gone and he does not need it  He denies any pain anywhere else in his body  He denies any current shortness of breath  He denies any nausea, vomiting, diarrhea  He is tolerating p o  but notes he has a decreased appetite  Physical Exam:   Temp:  [97 °F (36 1 °C)-98 1 °F (36 7 °C)] 97 9 °F (36 6 °C)  HR:  [58-78] 64  Resp:  [18-20] 20  BP: ()/(54-69) 95/54    Gen:  Pleasant, non-tachypnic, non-dyspnic  Conversant  Heart: regular rate and rhythm, S1S2 present, no murmur, rub or gallop  Lungs:  Decreased breath sounds bilaterally  Decreased air movement  Prolonged expiratory phase  However no current wheezes, crackles, rhonchi  No accessory muscle use or respiratory distress  Abd: soft, non-tender, non-distended  NABS, no guarding, rebound or peritoneal signs  Extremities: no clubbing, cyanosis or edema  2+pedal pulses bilaterally  Neuro: awake, alert  Fluent speech  Skin: warm and dry: no petechiae, purpura and rash      LABS: Results from last 7 days  Lab Units 08/07/18  0549 08/03/18  0513 08/02/18  1647 08/02/18  1050   WBC Thousand/uL 7 16 7 01  --  8 12   HEMOGLOBIN g/dL 12 2 14 3  --  14 4   HEMATOCRIT % 41 6 48 9  --  48 7   PLATELETS Thousands/uL 165 179 195 243       Results from last 7 days  Lab Units 08/08/18  0429 08/07/18  0548 08/06/18  0600   SODIUM mmol/L 142 137 145   POTASSIUM mmol/L 3 6 4 2 4 8   CHLORIDE mmol/L 101 99* 101   CO2 mmol/L 44* 44* >45*   BUN mg/dL 25 25 26*   CREATININE mg/dL 0 64 0 63 0 82   GLUCOSE RANDOM mg/dL 105 69 80   CALCIUM mg/dL 8 7 8 5 8 8       Hospital Data:  8/7:  Right lower extremity ultrasound:  Negative for DVT  Patient refused left lower extremity ultrasound  8/7:  V/Q scan:  Low probability pulmonary embolus     8/7:  Lower extremity Dopplers:  Pending     8/2:  Chest x-ray:  Bibasilar atelectasis     8/2:  Blood cultures:  Negative x2    8/3 echocardiogram:  Left ventricular ejection fraction 75%  No regional wall motion abnormalities  Wall thickness mildly-moderately increased  Left atrium mildly dilated  Right atrium moderately dilated  Mild mitral regurgitation, mild tricuspid regurgitation         ---------------------------------------------------------------------------------------------------------------  This note has been constructed using a voice recognition system

## 2018-08-09 LAB
ANION GAP SERPL CALCULATED.3IONS-SCNC: -1 MMOL/L (ref 4–13)
ATRIAL RATE: 202 BPM
BUN SERPL-MCNC: 34 MG/DL (ref 5–25)
CALCIUM SERPL-MCNC: 9.3 MG/DL (ref 8.3–10.1)
CHLORIDE SERPL-SCNC: 103 MMOL/L (ref 100–108)
CO2 SERPL-SCNC: 42 MMOL/L (ref 21–32)
CREAT SERPL-MCNC: 0.89 MG/DL (ref 0.6–1.3)
GFR SERPL CREATININE-BSD FRML MDRD: 71 ML/MIN/1.73SQ M
GLUCOSE SERPL-MCNC: 90 MG/DL (ref 65–140)
POTASSIUM SERPL-SCNC: 3.6 MMOL/L (ref 3.5–5.3)
QRS AXIS: -36 DEGREES
QRSD INTERVAL: 90 MS
QT INTERVAL: 374 MS
QTC INTERVAL: 415 MS
SODIUM SERPL-SCNC: 144 MMOL/L (ref 136–145)
T WAVE AXIS: -26 DEGREES
VENTRICULAR RATE: 74 BPM

## 2018-08-09 PROCEDURE — 94760 N-INVAS EAR/PLS OXIMETRY 1: CPT

## 2018-08-09 PROCEDURE — 80048 BASIC METABOLIC PNL TOTAL CA: CPT | Performed by: INTERNAL MEDICINE

## 2018-08-09 PROCEDURE — 99222 1ST HOSP IP/OBS MODERATE 55: CPT | Performed by: FAMILY MEDICINE

## 2018-08-09 PROCEDURE — 99232 SBSQ HOSP IP/OBS MODERATE 35: CPT | Performed by: INTERNAL MEDICINE

## 2018-08-09 PROCEDURE — 94660 CPAP INITIATION&MGMT: CPT

## 2018-08-09 PROCEDURE — 93010 ELECTROCARDIOGRAM REPORT: CPT | Performed by: INTERNAL MEDICINE

## 2018-08-09 RX ORDER — FUROSEMIDE 40 MG/1
40 TABLET ORAL DAILY
Status: DISCONTINUED | OUTPATIENT
Start: 2018-08-10 | End: 2018-08-10 | Stop reason: HOSPADM

## 2018-08-09 RX ADMIN — LATANOPROST 1 DROP: 50 SOLUTION OPHTHALMIC at 09:24

## 2018-08-09 RX ADMIN — ACETAZOLAMIDE: 500 INJECTION, POWDER, LYOPHILIZED, FOR SOLUTION INTRAVENOUS at 12:36

## 2018-08-09 RX ADMIN — HEPARIN SODIUM 5000 UNITS: 5000 INJECTION, SOLUTION INTRAVENOUS; SUBCUTANEOUS at 21:35

## 2018-08-09 RX ADMIN — FLUTICASONE PROPIONATE 1 SPRAY: 50 SPRAY, METERED NASAL at 09:24

## 2018-08-09 RX ADMIN — FUROSEMIDE 40 MG: 10 INJECTION, SOLUTION INTRAMUSCULAR; INTRAVENOUS at 09:23

## 2018-08-09 RX ADMIN — CHLORHEXIDINE GLUCONATE 15 ML: 1.2 RINSE ORAL at 09:23

## 2018-08-09 RX ADMIN — Medication 325 MG: at 09:23

## 2018-08-09 RX ADMIN — ACETAZOLAMIDE: 500 INJECTION, POWDER, LYOPHILIZED, FOR SOLUTION INTRAVENOUS at 00:28

## 2018-08-09 RX ADMIN — ACETAZOLAMIDE: 500 INJECTION, POWDER, LYOPHILIZED, FOR SOLUTION INTRAVENOUS at 19:17

## 2018-08-09 RX ADMIN — PANTOPRAZOLE SODIUM 20 MG: 20 TABLET, DELAYED RELEASE ORAL at 06:17

## 2018-08-09 RX ADMIN — TIMOLOL MALEATE 1 DROP: 2.5 SOLUTION OPHTHALMIC at 09:24

## 2018-08-09 RX ADMIN — FINASTERIDE 5 MG: 5 TABLET, FILM COATED ORAL at 09:23

## 2018-08-09 RX ADMIN — ACETAZOLAMIDE: 500 INJECTION, POWDER, LYOPHILIZED, FOR SOLUTION INTRAVENOUS at 06:18

## 2018-08-09 RX ADMIN — Medication 325 MG: at 19:17

## 2018-08-09 RX ADMIN — HEPARIN SODIUM 5000 UNITS: 5000 INJECTION, SOLUTION INTRAVENOUS; SUBCUTANEOUS at 15:13

## 2018-08-09 RX ADMIN — CHLORHEXIDINE GLUCONATE 15 ML: 1.2 RINSE ORAL at 21:32

## 2018-08-09 RX ADMIN — FLUTICASONE PROPIONATE 1 SPRAY: 50 SPRAY, METERED NASAL at 19:17

## 2018-08-09 RX ADMIN — HEPARIN SODIUM 5000 UNITS: 5000 INJECTION, SOLUTION INTRAVENOUS; SUBCUTANEOUS at 06:18

## 2018-08-09 RX ADMIN — ATORVASTATIN CALCIUM 40 MG: 40 TABLET, FILM COATED ORAL at 19:17

## 2018-08-09 RX ADMIN — ASPIRIN 81 MG: 81 TABLET, COATED ORAL at 09:23

## 2018-08-09 NOTE — PLAN OF CARE
DISCHARGE PLANNING - CARE MANAGEMENT     Discharge to post-acute care or home with appropriate resources Progressing        METABOLIC, FLUID AND ELECTROLYTES - ADULT     Electrolytes maintained within normal limits Progressing     Fluid balance maintained Progressing        MUSCULOSKELETAL - ADULT     Maintain or return mobility to safest level of function Progressing        Nutrition/Hydration-ADULT     Nutrient/Hydration intake appropriate for improving, restoring or maintaining nutritional needs Progressing        Potential for Falls     Patient will remain free of falls Progressing        Prexisting or High Potential for Compromised Skin Integrity     Skin integrity is maintained or improved Progressing        RESPIRATORY - ADULT     Achieves optimal ventilation and oxygenation Progressing        SKIN/TISSUE INTEGRITY - ADULT     Skin integrity remains intact Progressing

## 2018-08-09 NOTE — CONSULTS
Consultation - Palliative and Supportive Care   Russ Rose  80 y o  male [id number]    Patient Active Problem List   Diagnosis    Sinus bradycardia    Essential hypertension    Pure hypercholesterolemia    Coronary artery disease involving native heart without angina pectoris    Non-ST elevation (NSTEMI) myocardial infarction ([facility] [address] )    Acute respiratory failure with hypoxia (HCC)    Acute respiratory failure with hypoxia and hypercarbia (HCC)    Acute on chronic diastolic CHF (congestive heart failure) ([facility])    Troponin I above reference range    BPH (benign prostatic hyperplasia)    Ambulatory dysfunction    Alkalosis    Chronic respiratory acidosis   - acute delirium d/t medical condition    Plan:  1  Symptom management - pt denies symptoms at this time   - defer symptom management to primary team   Will withhold opioids at this time    - NTG as ordered is appropriate    2  Goals - limited cares   - Pt with poor insight and very limited competence during my exam   His granddaughter appears to lead the family's decisions  - We do feel that the patient lacks sufficient insight and judgment to consistently make clear and rational decisions about his health care  In particular, his expressed desire to go home today does not take into context his significant debility and the fact that he lacks 24hr support in the home  - From a medicoethical standpoint, it appears reasonable to assert that his safety overrides his autonomy on the subject of disposition, at this time  We support -- despite his objection -- the planned disposition to an ARU  Code Status: DNR/DNI - Level 3   Decisional apparatus:  Patient is not competent on my exam today  If competence is lost, patient's substitute decision maker would default to son by [place], but granddaughter Prema Becerra) is leading the family's decisions     Advance Directive / Living Will / POLST:  None on file     I have reviewed the patient's controlled substance dispensing history in the Prescription Drug Monitoring Program in compliance with the [facility] regulations before prescribing any controlled substances  We appreciate the invitation to be involved in this patient's care  We will continue to follow  Please do not hesitate to reach our on call provider through our clinic answering service at  should you have acute symptom control concerns  Neida Hester MD  Palliative and Supportive Care  Pager: [phone]         IDENTIFICATION:  Consults  Physician Requesting Consult: Ignatius Meckel, MD  Reason for Consult / Principal Problem: goals  Hx and PE limited by: pt's dismissive tone, pt's delirium    HISTORY OF PRESENT ILLNESS:       Tyra Lee  is a 80 y o  male who presents with dyspnea and leg weakness  This fellow is well-known in the Network for his severe coronary disease, diastolic CHF, chronic respiratory insufficiency, and ambulatory dysfunction  He is a , and has received a good deal of his care in the [facility], where he is 100% service connected  He presents with subacute leg weakness of two days' duration, and scans and studies revealed stigmata of ACS, which was later refined to NSTEMI  He has been in hospital for a week now, when we are consulted to help determine next steps in his care: ongoing aggressive management vs hospice  Upon my visit, the pt appears very dismissive of me, and distrustful that I am a doctor (I was not wearing a white coat)  He expressed little interest in meeting with me, as he denied any symptoms, and he was waiting for Dr Simran Olivas to visit  "She's gonna come in, discharge me, and I'll be home by 4PM today "  He denied any chest pain, dyspnea, or other troubles  In speaking with pt's granddaughter, there is a clear sense she has that pt has not at all been himself    She reports that he was concerned that Marinette was coming out of the ceiling the other day, and despite no plans for discharge, he has been perseverating on a 4PM discharge tdoay  We frankly discussed the interplay between his heart, lung, and kidney illnesses, and Denilson Multani agreed that he is not medically optimized at this moment  He was recently fairly active, and Denilson Multani would hope not for heroic medical intervention, but some ability to take rehab and get back to baseline  For this purpose, she is hopeful that he will accept ARU stay  She also expresses her firm belief that -- at this moment -- he is completely unsafe to D/C to [facility]    Review of Systems   Unable to perform ROS: dementia       Past Medical History:   Diagnosis Date    Anemia     Cardiac disease     GERD (gastroesophageal reflux disease)     History of BPH      History reviewed  No pertinent surgical history  Social History     Social History    Marital status: Single     Spouse name: N/A    Number of children: N/A    Years of education: N/A     Occupational History    Not on file  Social History Main Topics    Smoking status: Never Smoker    Smokeless tobacco: Never Used    Alcohol use No    Drug use: No    Sexual activity: Not on file     Other Topics Concern    Not on file     Social History Narrative    No narrative on file     History reviewed  No pertinent family history      MEDICATIONS / ALLERGIES:    current meds:   Current Facility-Administered Medications   Medication Dose Route Frequency    acetaminophen (TYLENOL) tablet 650 mg  650 mg Oral Q6H PRN    acetaZOLAMIDE (DIAMOX) 250 mg in sterile water 2 5 mL IV syringe   Intravenous Q6H    aspirin (ECOTRIN LOW STRENGTH) EC tablet 81 mg  81 mg Oral Daily    atorvastatin (LIPITOR) tablet 40 mg  40 mg Oral Daily With Dinner    bisacodyl (DULCOLAX) EC tablet 5 mg  5 mg Oral Daily PRN    chlorhexidine (PERIDEX) 0 12 % oral rinse 15 mL  15 mL Swish & Spit Q12H [facility]    docusate sodium (COLACE) capsule 100 mg  100 mg Oral Daily PRN    ferrous sulfate tablet 325 mg  325 mg Oral BID    finasteride (PROSCAR) tablet 5 mg  5 mg Oral Daily    fluticasone (FLONASE) 50 mcg/act nasal spray 1 spray  1 spray Nasal BID    furosemide (LASIX) injection 40 mg  40 mg Intravenous Daily    heparin (porcine) subcutaneous injection 5,000 Units  5,000 Units Subcutaneous Q8H ELIZABETH    latanoprost (XALATAN) 0 005 % ophthalmic solution 1 drop  1 drop Left Eye Daily    nitroglycerin (NITROSTAT) SL tablet 0 4 mg  0 4 mg Sublingual Q5 Min PRN    pantoprazole (PROTONIX) EC tablet 20 mg  20 mg Oral Early Morning    timolol (TIMOPTIC) 0 25 % ophthalmic solution 1 drop  1 drop Left Eye Daily       No Known Allergies    OBJECTIVE:    Physical Exam  Physical Exam   Constitutional: No distress  Frail, cachectic   HENT:   Head: Normocephalic and atraumatic  Right Ear: External ear normal    Left Ear: External ear normal    Eyes: Conjunctivae and EOM are normal  Pupils are equal, round, and reactive to light  Right eye exhibits no discharge  Left eye exhibits no discharge  Neck: No tracheal deviation present  Cardiovascular:   Tachy, irreg   Pulmonary/Chest: Effort normal  No stridor  No respiratory distress  Abdominal: Soft    scaphoid   Musculoskeletal:   Generalized sarcopenia  Skin: Skin is warm and dry  No rash noted  He is not diaphoretic  No erythema  There is pallor  Psychiatric:   Pt engaging in odd behaviors, such as wearign nitrile exam gloves to eat breakfast, and insisting that aide prepare his meal, despite having the strength to do so himself  Lab Results:   I have personally reviewed pertinent labs  , CBC: No results found for: WBC, HGB, HCT, MCV, PLT, ADJUSTEDWBC, MCH, MCHC, RDW, MPV, NRBC, CMP:   Lab Results   Component Value Date     08/09/2018    K 3 6 08/09/2018     08/09/2018    CO2 42 (H) 08/09/2018    ANIONGAP -1 (L) 08/09/2018    BUN 34 (H) 08/09/2018    CREATININE 0 89 08/09/2018    GLUCOSE 90 08/09/2018    CALCIUM 9 3 08/09/2018    EGFR 71 08/09/2018   Elevated CO2 consistent with gas trapping pulmonopathy    Imaging Studies: none pertinent  EKG, Pathology, and Other Studies: rhythm strips have disclosed a-fib    Counseling / Coordination of Care  Total floor / unit time spent today 60+ minutes  Greater than 50% of total time was spent with the patient and / or family counseling and / or coordination of care   A description of the counseling / coordination of care: review and assessment of decisional apparatus and capacity, applicable legal codes and extant documents; consideration of hospice benefits; direct conference with medicine attending on the floor re: pt's case and competence

## 2018-08-09 NOTE — SOCIAL WORK
Pt is refusing to go to [facility] at this time  Pt would need 24/7 supervision at home  Pt has [facility] [phone]  They [facility] and [facility] will pay for 21 hours a week  If pt wants more hours he would have to pay private  [facility] can also provide 24/7 live in care, if pt would be able to pay for this service  TC to the RN at the [place] and they can not provide any more services in the home   CM will f/u

## 2018-08-09 NOTE — PROGRESS NOTES
Progress Note - Pulmonary   Mitzy Jovel  80 y o  male MRN: [id number]  Unit/Bed#: E4 -01 Encounter: [id number]      Assessment:  1  Chronic hypoxic/hypercapnic respiratory failure  Compensated chronic severe respiratory acidosis   Serum bicarb mildly improved to 42  2  Acute/chronic diastolic congestive heart failure    Plan:  1  Continue Diamox 250 mg IV Q 6 to complete 12 doses  2   Patient did not tolerate BiPAP  3  Titrate oxygen to maintain O2 saturation greater than 90%  Home O2 evaluation ordered prior to discharge  4   Continue Lasix per Cardiology/medicine  Subjective:   Patient out of bed sitting in chair eating breakfast much more alert today compared to yesterday  Denies any shortness of breath, cough or chest pain  Objective:   Vitals: Blood pressure 128/62, pulse 76, temperature (!) 97 °F (36 1 °C), temperature source Temporal, resp  rate 18, height 5' 5" (1 651 m), weight 51 3 kg (113 lb 1 5 oz), SpO2 96 % , room-air, Body mass index is 18 82 kg/m²  Intake/Output Summary (Last 24 hours) at 08/09/18 1120  Last data filed at 08/09/18 0700   Gross per 24 hour   Intake              240 ml   Output             1350 ml   Net            -1110 ml         Physical Exam  Gen: Awake, alert, no acute distress  Patient is much more alert today compared with yesterday  HEENT: Mucous membranes moist, no oral lesions, no thrush  NECK: No accessory muscle use  Cardiac: Regular, single S1, single S2, no murmurs, no rubs, no gallops  Lungs:  Decreased breath sounds diffusely  Abdomen: normoactive bowel sounds, soft nontender, nondistended, no rebound or rigidity, no guarding  Extremities: no cyanosis, no clubbing, no edema    Labs: I have personally reviewed pertinent lab results  , ABG: No results found for: PHART, JUF7JGR, PO2ART, CEU7JOZ, X4UQLTBZ, BEART, SOURCE, BNP: No results found for: BNP, CBC: No results found for: WBC, HGB, HCT, MCV, PLT, ADJUSTEDWBC, MCH, MCHC, RDW, MPV, NRBC, CMP:   Lab Results   Component Value Date     08/09/2018    K 3 6 08/09/2018     08/09/2018    CO2 42 (H) 08/09/2018    ANIONGAP -1 (L) 08/09/2018    BUN 34 (H) 08/09/2018    CREATININE 0 89 08/09/2018    GLUCOSE 90 08/09/2018    CALCIUM 9 3 08/09/2018    EGFR 71 08/09/2018     Imaging and other studies: I have personally reviewed pertinent reports     and I have personally reviewed pertinent films in PACS      Denia No PA-C

## 2018-08-09 NOTE — PROGRESS NOTES
Progress Note - Deepti Collier  80 y o  male MRN: [id number]    Unit/Bed#: E4 -01 Encounter: [id number]      Assessment/Plan:  1-acute/chronic diastolic congestive heart failure exacerbation:   patient presented with evidence of volume overload and acute/chronic diastolic congestive heart failure exacerbation                 -he continues to gradually improve              -his weight has decreased 14 kg since admission, on IV Lasix              -Pro BNP improved from 19,000 to 5,000              -will change to p o  Lasix              -monitor daily weights and creatinine              -echocardiogram from 08/03 with left ventricular ejection fraction 75%   No regional wall motion abnormalities   Wall thickness mildly-moderately increased   Right ventricle mildly-moderately dilated      2-essential hypertension:  Blood pressure currently running low-normal               -Continue to hold ACE-inhibitor due to prior hyperkalemia                  3-hyperlipidemia:  Continue statin     4-status post hyperkalemia:  ACE-inhibitor on hold  Mashamargie Hoyoss resolved      5-coronary artery disease:  With history of previous MRI   Patient presented with chest pain and a positive troponin   He was evaluated by the cardiology service   His positive troponin was felt to be most likely secondary to a non STEMI type 2, secondary to his congestive heart failure   Further ischemic testing was not recommended by Cardiology at this time               -continue medical therapy with aspirin, statin               -no beta-blocker due to bradycardia              -the patient had an episode of chest pain on 8/8 that spontaneously resolved prior to him receiving any nitroglycerin  EKG is currently in atrial fibrillation with Q-wave in lead V 3 and AVF with chart new, as well as T-wave inversions in lead 3, AVF, V1-V5  troponins were 0 04, 0 09, 0 12    Case had been reviewed with cardiology team   Continue medical therapy, and conservative management      6-acute hypoxic/hypercapnic respiratory failure:  Patient presented to the ER in respiratory distress with hypoxia/hypercapnia   He initially was admitted to the step-down unit and required BiPAP              -continue to monitor and titrate off oxygen as tolerated              -will need home O2 eval prior to discharge               -V/Q scan low probability for pulmonary embolism              -pulmonary service notes the patient has chronic, severe, respiratory acidosis  Possibly due to restrictive lung disease or sleep apnea              -no history of COPD upon review of his old charts              -pt did not tolerate bipap l              -sats adequate on 2 5L nc    -patient's mental status fluctuates widely during the day, likely depending on his level of hypercapnia, and if he has been sleeping, or doing adequate respirations to blow off his CO2      7-PAF:  EKG revealed atrial fibrillation during the time of chest pain  Patient has a prior history of sinus bradycardia, it would not tolerate beta-blockers              -continue aspirin      8-ambulatory dysfunction:  Continue PT  Peggi Older recommend inpatient short-term rehab    is following             -discussed with patient and his granddaughter at the bedside  Patient currently requires a 2 person assistance for transfers, and 24 hour supervision  He is not safe to be discharged to an independent living situation at this time  His medical team recommends inpatient rehab      9-BPH:  Without current symptoms   Continue to monitor     10-chronic anemia:  Patient is on iron as an outpatient     11-chronic, severe, respiratory acidosis:  Patient's ABG reveals a normal pH, and patient is compensating for chronic severe respiratory acidosis, with compensatory metabolic alkalosis   He was evaluated by the pulmonary service   He was started on Diamox   BiPAP is ordered at bedtime-however patient did not tolerate   Continue to monitor   -recheck BMP in a m  anticipate changing Diamox     12- family:  updated granddaughter Paloma Mary        VTE Pharmacologic Prophylaxis: Heparin  VTE Mechanical Prophylaxis: sequential compression device      Certification Statement: The patient will continue to require additional inpatient hospital stay due to need for further acute intervention for congestive heart failure, respiratory acidosis    Status: inpatient     Discussed with patient's nurse and   Discussed with Dr Espinoza Del    ===================================================================    Subjective:  Patient denies any complaints  He is demanding on in the hospital   He denies any shortness of breath  He notes there is nothing wrong with him and he feels better  Physical Exam:   Temp:  [96 7 °F (35 9 °C)-97 1 °F (36 2 °C)] 97 1 °F (36 2 °C)  HR:  [54-76] 54  Resp:  [16-20] 16  BP: (100-135)/(57-62) 100/57    Gen:  non-tachypnic, non-dyspnic  Conversant  Sitting up in the chair  Heart: regular rate and rhythm, S1S2 present, no murmur, rub or gallop  Lungs:  Decreased air movement, however no wheezes, crackles, rhonchi    No accessory muscle use or respiratory distress  Abd: soft, non-tender, non-distended  NABS, no guarding, rebound or peritoneal signs  Extremities: no clubbing, cyanosis or edema  2+pedal pulses bilaterally  Full range of motion  Neuro: awake, alert  Moving all 4 extremities  Skin: warm and dry: no petechiae, purpura and rash      LABS:     Results from last 7 days  Lab Units 08/07/18  0549 08/03/18  0513   WBC Thousand/uL 7 16 7 01   HEMOGLOBIN g/dL 12 2 14 3   HEMATOCRIT % 41 6 48 9   PLATELETS Thousands/uL 165 179       Results from last 7 days  Lab Units 08/09/18  0433 08/08/18  0429 08/07/18  0548   SODIUM mmol/L 144 142 137   POTASSIUM mmol/L 3 6 3 6 4 2   CHLORIDE mmol/L 103 101 99*   CO2 mmol/L 42* 44* 44*   BUN mg/dL 34* 25 25   CREATININE mg/dL 0 89 0 64 0 63   GLUCOSE RANDOM mg/dL 90 105 69   CALCIUM mg/dL 9 3 8 7 8 5       Hospital Data:    8/7:  Right lower extremity ultrasound:  Negative for DVT  Patient refused left lower extremity ultrasound      8/7:  V/Q scan:  Low probability pulmonary embolus     8/7:  Lower extremity Dopplers:  Pending     8/2:  Chest x-ray:  Bibasilar atelectasis     8/2:  Blood cultures:  Negative x2     8/3 echocardiogram:  Left ventricular ejection fraction 75%  No regional wall motion abnormalities  Wall thickness mildly-moderately increased  Left atrium mildly dilated  Right atrium moderately dilated  Mild mitral regurgitation, mild tricuspid regurgitation            ---------------------------------------------------------------------------------------------------------------  This note has been constructed using a voice recognition system

## 2018-08-09 NOTE — CASE MANAGEMENT
Continued Stay Review    Date:  8/9/2018    Vital Signs: /62 (BP Location: Left arm)   Pulse 76   Temp (!) 97 °F (36 1 °C) (Temporal)   Resp 18   Ht 5' 5" (1 651 m)   Wt 51 3 kg (113 lb 1 5 oz)   SpO2 96%   BMI 18 82 kg/m²     Medications:   Scheduled Meds:   Current Facility-Administered Medications:         Diamox  250 mg  Intravenous Q6H    aspirin 81 mg Oral Daily    atorvastatin 40 mg Oral Daily With Dinner           chlorhexidine 15 mL Swish & Spit Q12H [facility]           ferrous sulfate 325 mg Oral BID    finasteride 5 mg Oral Daily    fluticasone 1 spray Nasal BID    furosemide 40 mg Intravenous Daily    heparin (porcine) 5,000 Units Subcutaneous Q8H [facility]    latanoprost 1 drop Left Eye Daily           pantoprazole 20 mg Oral Early Morning    timolol 1 drop Left Eye Daily      Continuous Infusions:    PRN Meds:   acetaminophen    bisacodyl    docusate sodium    nitroglycerin    Abnormal Labs/Diagnostic Results:  CO2  42,   BUN 34    Age/Sex: 80 y o  male     Assessment/Plan:   Per Pulm  8/9:     Chronic hypoxic/hypercapnic respiratory failure  Compensated chronic severe respiratory acidosis   Serum bicarb mildly improved to 42  2  Acute/chronic diastolic congestive heart failure     Plan:  1  Continue Diamox 250 mg IV Q 6 to complete 12 doses  2   Patient did not tolerate BiPAP  3  Titrate oxygen to maintain O2 saturation greater than 90%  Home O2 evaluation ordered prior to discharge  4   Continue Lasix per Cardiology/medicine      Per ATTENDING  8/8  Assessment/Plan:  1-acute/chronic diastolic congestive heart failure exacerbation:   patient presented with evidence of volume overload and acute/chronic diastolic congestive heart failure exacerbation                 -he continues to gradually improve              -his weight has decreased 12 kg since admission              -Pro BNP improved from 19,000 to 5,000              -Continue IV diuresis              -currently on Lasix 40 mg IV daily              -monitor daily weights and creatinine              -echocardiogram from 08/03 with left ventricular ejection fraction 75%   No regional wall motion abnormalities   Wall thickness mildly-moderately increased   Right ventricle mildly-moderately dilated      2-essential hypertension:  Blood pressure currently adequately controlled               -blood pressure today was 149/66  Currently 95/54 during current episode of chest pain               -Continue to hold ACE-inhibitor due to prior hyperkalemia                  3-hyperlipidemia:  Continue statin     4-status post hyperkalemia:  ACE-inhibitor on hold  [place] resolved      5-coronary artery disease:  With history of previous MRI   Patient presented with chest pain and a positive troponin   He was evaluated by the cardiology service   His positive troponin was felt to be most likely secondary to a non STEMI type 2, secondary to his congestive heart failure   Further ischemic testing was not recommended by Cardiology at this time               -continue medical therapy with aspirin, statin               -no beta-blocker due to bradycardia              -the patient had a current episode of chest pain that would spontaneously resolved prior to him receiving any nitroglycerin  As his pain had been resolving he refused nitroglycerin  He is currently sleeping comfortably  EKG is currently in atrial fibrillation with Q-wave in lead V 3 and AVF with chart new, as well as T-wave inversions in lead 3, AVF, V1-V5  Reviewed with cardiology team   Continue medical therapy      6-acute hypoxic/hypercapnic respiratory failure:  Patient presented to the ER in respiratory distress with hypoxia/hypercapnia   He initially was admitted to the step-down unit and required BiPAP                -continue to monitor and titrate off oxygen as tolerated              -will need home O2 eval prior to discharge               -V/Q scan low probability for pulmonary embolism              -pulmonary service notes the patient has chronic, severe, respiratory acidosis  Possibly due to restrictive lung disease or sleep apnea              -no history of COPD upon review of his old charts              -pt did not tolerate bipap last evening              -sats adequate on 2 5L nc      7-PAF:  EKG revealed atrial fibrillation during the time of chest pain  Patient has a prior history of sinus bradycardia, it would not tolerate beta-blockers              -continue aspirin      8-ambulatory dysfunction:  Continue PT  Arlet Kam recommend inpatient short-term rehab    is following              -patient's he adamantly refuses rehab and wishes to go home     9-BPH:  Without current symptoms   Continue to monitor     10-chronic anemia:  Patient is on iron as an outpatient     11-chronic, severe, respiratory acidosis:  Patient's ABG reveals a normal pH, and patient is compensating for chronic severe respiratory acidosis, with compensatory metabolic alkalosis   He was evaluated by the pulmonary service  Riley Hester was started on Diamox   BiPAP is ordered at bedtime-however patient did not tolerate   Continue to monitor     12- family:  Called granddaughter Dudley Short and LM to call my cell number for update      Discussed with patient's nurse  Discussed with cardiologist Dr Alessandro Castillo  D/w Dr Devonte Man- will also consult palliative care:  Pt notes he is tired and wishes to be dc home       VTE Pharmacologic Prophylaxis: Heparin  VTE Mechanical Prophylaxis: sequential compression device        Certification Statement: The patient will continue to require additional inpatient hospital stay due to need for further acute intervention for respiratory acidosis, chf       Discharge Plan:  Per  Note: Pt is refusing to go to [facility] at this time  Pt would need 24/7 supervision at home  Pt has [facility] [phone]  They [facility] and [facility] will pay for 21 hours a week   If pt wants more hours he would have to pay private  [facility] can also provide 24/7 live in care, if pt would be able to pay for this service  TC to the RN at the [facility] and they can not provide any more services in the home  CM will f/u     Thank you,  Taqueria Pendleton  [address] Utilization Review Department  Phone: [phone]; Fax [phone]  ATTENTION: The Network Utilization Review Department is now centralized for our 9 Facilities  Make a note that we have a new phone and fax numbers for our Department  Please call with any questions or concerns to [phone] and carefully follow the prompts so that you are directed to the right person  All voicemails are confidential  Fax any determinations, approvals, denials, and requests for initial or continue stay review clinical to [phone]  Due to HIGH CALL volume, it would be easier if you could please send faxed requests to expedite your requests and in part, help us provide discharge notifications faster

## 2018-08-10 VITALS
HEIGHT: 65 IN | BODY MASS INDEX: 17.81 KG/M2 | SYSTOLIC BLOOD PRESSURE: 132 MMHG | HEART RATE: 69 BPM | TEMPERATURE: 97.6 F | RESPIRATION RATE: 18 BRPM | OXYGEN SATURATION: 99 % | WEIGHT: 106.92 LBS | DIASTOLIC BLOOD PRESSURE: 68 MMHG

## 2018-08-10 LAB
ANION GAP SERPL CALCULATED.3IONS-SCNC: 1 MMOL/L (ref 4–13)
BUN SERPL-MCNC: 30 MG/DL (ref 5–25)
CALCIUM SERPL-MCNC: 9 MG/DL (ref 8.3–10.1)
CHLORIDE SERPL-SCNC: 104 MMOL/L (ref 100–108)
CO2 SERPL-SCNC: 39 MMOL/L (ref 21–32)
CREAT SERPL-MCNC: 0.85 MG/DL (ref 0.6–1.3)
ERYTHROCYTE [DISTWIDTH] IN BLOOD BY AUTOMATED COUNT: 15.2 % (ref 11.6–15.1)
GFR SERPL CREATININE-BSD FRML MDRD: 73 ML/MIN/1.73SQ M
GLUCOSE SERPL-MCNC: 103 MG/DL (ref 65–140)
HCT VFR BLD AUTO: 42.1 % (ref 36.5–49.3)
HGB BLD-MCNC: 12 G/DL (ref 12–17)
MCH RBC QN AUTO: 29.4 PG (ref 26.8–34.3)
MCHC RBC AUTO-ENTMCNC: 28.5 G/DL (ref 31.4–37.4)
MCV RBC AUTO: 103 FL (ref 82–98)
PLATELET # BLD AUTO: 156 THOUSANDS/UL (ref 149–390)
PMV BLD AUTO: 10.4 FL (ref 8.9–12.7)
POTASSIUM SERPL-SCNC: 3 MMOL/L (ref 3.5–5.3)
RBC # BLD AUTO: 4.08 MILLION/UL (ref 3.88–5.62)
SODIUM SERPL-SCNC: 144 MMOL/L (ref 136–145)
WBC # BLD AUTO: 5.72 THOUSAND/UL (ref 4.31–10.16)

## 2018-08-10 PROCEDURE — 94761 N-INVAS EAR/PLS OXIMETRY MLT: CPT

## 2018-08-10 PROCEDURE — 85027 COMPLETE CBC AUTOMATED: CPT | Performed by: INTERNAL MEDICINE

## 2018-08-10 PROCEDURE — 99232 SBSQ HOSP IP/OBS MODERATE 35: CPT | Performed by: INTERNAL MEDICINE

## 2018-08-10 PROCEDURE — 94660 CPAP INITIATION&MGMT: CPT

## 2018-08-10 PROCEDURE — 99239 HOSP IP/OBS DSCHRG MGMT >30: CPT | Performed by: INTERNAL MEDICINE

## 2018-08-10 PROCEDURE — 80048 BASIC METABOLIC PNL TOTAL CA: CPT | Performed by: INTERNAL MEDICINE

## 2018-08-10 RX ORDER — FUROSEMIDE 40 MG/1
40 TABLET ORAL DAILY
Qty: 30 TABLET | Refills: 0 | Status: SHIPPED | OUTPATIENT
Start: 2018-08-11

## 2018-08-10 RX ORDER — NITROGLYCERIN 0.4 MG/1
0.4 TABLET SUBLINGUAL
Qty: 90 TABLET | Refills: 0 | Status: SHIPPED | OUTPATIENT
Start: 2018-08-10

## 2018-08-10 RX ADMIN — PANTOPRAZOLE SODIUM 20 MG: 20 TABLET, DELAYED RELEASE ORAL at 05:32

## 2018-08-10 RX ADMIN — LATANOPROST 1 DROP: 50 SOLUTION OPHTHALMIC at 09:02

## 2018-08-10 RX ADMIN — FINASTERIDE 5 MG: 5 TABLET, FILM COATED ORAL at 09:02

## 2018-08-10 RX ADMIN — Medication 325 MG: at 09:02

## 2018-08-10 RX ADMIN — TIMOLOL MALEATE 1 DROP: 2.5 SOLUTION OPHTHALMIC at 09:02

## 2018-08-10 RX ADMIN — ACETAZOLAMIDE: 500 INJECTION, POWDER, LYOPHILIZED, FOR SOLUTION INTRAVENOUS at 01:05

## 2018-08-10 RX ADMIN — ATORVASTATIN CALCIUM 40 MG: 40 TABLET, FILM COATED ORAL at 17:08

## 2018-08-10 RX ADMIN — HEPARIN SODIUM 5000 UNITS: 5000 INJECTION, SOLUTION INTRAVENOUS; SUBCUTANEOUS at 05:32

## 2018-08-10 RX ADMIN — DOCUSATE SODIUM 100 MG: 100 CAPSULE, LIQUID FILLED ORAL at 09:02

## 2018-08-10 RX ADMIN — CHLORHEXIDINE GLUCONATE 15 ML: 1.2 RINSE ORAL at 09:02

## 2018-08-10 RX ADMIN — Medication 325 MG: at 17:08

## 2018-08-10 RX ADMIN — ASPIRIN 81 MG: 81 TABLET, COATED ORAL at 09:02

## 2018-08-10 RX ADMIN — FLUTICASONE PROPIONATE 1 SPRAY: 50 SPRAY, METERED NASAL at 09:02

## 2018-08-10 RX ADMIN — ACETAZOLAMIDE: 500 INJECTION, POWDER, LYOPHILIZED, FOR SOLUTION INTRAVENOUS at 05:32

## 2018-08-10 RX ADMIN — HEPARIN SODIUM 5000 UNITS: 5000 INJECTION, SOLUTION INTRAVENOUS; SUBCUTANEOUS at 14:14

## 2018-08-10 RX ADMIN — FUROSEMIDE 40 MG: 40 TABLET ORAL at 09:02

## 2018-08-10 NOTE — CONSULTS
Consultation - Neuropsychology/Psychology Department  Deepti Collier  80 y o  male MRN: [id number]  Unit/Bed#: E4 -01 Encounter: [id number]        BACKGROUND:  Deepti Collier  is a 80y o  year old male who was referred for a Neuropsychological Exam to assess cognitive functioning and comment on capacity to make informed decisions  History of Present Illness  Initially presented with leg weakness, found to be profoundly hypoxic requiring BiPAP resuscitation; lives alone  Physician Requesting Consult: Prieto Davis MD  Consults      Historical Information   Past Medical History:   Diagnosis Date    Anemia     Cardiac disease     GERD (gastroesophageal reflux disease)     History of BPH      History reviewed  No pertinent surgical history  Social History   History   Alcohol Use No     History   Drug Use No     History   Smoking Status    Never Smoker   Smokeless Tobacco    Never Used     Family History: History reviewed  No pertinent family history      Meds/Allergies   current meds:   Current Facility-Administered Medications   Medication Dose Route Frequency    acetaminophen (TYLENOL) tablet 650 mg  650 mg Oral Q6H PRN    aspirin (ECOTRIN LOW STRENGTH) EC tablet 81 mg  81 mg Oral Daily    atorvastatin (LIPITOR) tablet 40 mg  40 mg Oral Daily With Dinner    bisacodyl (DULCOLAX) EC tablet 5 mg  5 mg Oral Daily PRN    chlorhexidine (PERIDEX) 0 12 % oral rinse 15 mL  15 mL Swish & Spit Q12H [address]    docusate sodium (COLACE) capsule 100 mg  100 mg Oral Daily PRN    ferrous sulfate tablet 325 mg  325 mg Oral BID    finasteride (PROSCAR) tablet 5 mg  5 mg Oral Daily    fluticasone (FLONASE) 50 mcg/act nasal spray 1 spray  1 spray Nasal BID    furosemide (LASIX) tablet 40 mg  40 mg Oral Daily    heparin (porcine) subcutaneous injection 5,000 Units  5,000 Units Subcutaneous Q8H [address]    latanoprost (XALATAN) 0 005 % ophthalmic solution 1 drop  1 drop Left Eye Daily    nitroglycerin (NITROSTAT) SL tablet 0 4 mg  0 4 mg Sublingual Q5 Min PRN    pantoprazole (PROTONIX) EC tablet 20 mg  20 mg Oral Early Morning    timolol (TIMOPTIC) 0 25 % ophthalmic solution 1 drop  1 drop Left Eye Daily       No Known Allergies    Family and Social Support:   Discharge planning discussed with[de-identified] Grand-dtr and pt     Behavioral Observations: Alert, UNABLE to accurately state the Year, Season, Month, Day/Week, and Day/Month; affect appeared flat, denied depressed mood and anxiety; generally non-spontaneous; Patient was unable to express reason for hospitalization, medical history and stated, "there is nothing wrong with me"  States he is capable of performing his own cooking, cleaning and bill paying  When asked where he will go upon d/c he stated, "to the motel", when asked later he stated, "I don't know"  Cognitive Examination    General Cognitive Functioning  MMSE = 9/28 with deficits in orientation, registration of information, recall, workingmemory; Attention/Concertration  Auditory Selective Attention = Impaired; Auditory Vigilance = Impaired; Information Processing Speed = Impaired    Frontal Systems/Executive Functioning  Mental Flexibility/Cognitive Control = Impaired; Working Memory = Impaired; Abstract Reasoning = Impaired;  Commonsense Reasoning and Judgement = Impaired    Language Functioning  Confrontation naming = Impaired; Comprehension of Complex Ideational Material = Impaired; Praxis = WNL's; Repetition = WNL'S; Basic Reading = WNL's;  Following Commands = Impaired    Memory Functioning  Narrative Recall - Short Delay = Impaired; Long Delay Narrative Recall = Impaired;  Visual Recognition = IMpaired    Visuo-Spatial Abilities Not Assessed    Functional Knowledge  Health & Safety Knowledge = Within Normal Limits;      Emotional Functioning:  Flat affect    Summary/Impression: Results of Neuropsychological Exam revealed diffuse cognitive dysfunction, and on his awareness of personal health status and ability to evaluate health problems, handle medical emergencies and take safety precautions was assessed in the IMPAIRED range  At this time, patient does not appear to have capacity to make fully informed medica and self care decisions   encephalopathy    Recommendations:

## 2018-08-10 NOTE — PLAN OF CARE
Problem: Potential for Falls  Goal: Patient will remain free of falls  INTERVENTIONS:  - Assess patient frequently for physical needs  -  Identify cognitive and physical deficits and behaviors that affect risk of falls  -  [place] fall precautions as indicated by assessment   - Educate patient/family on patient safety including physical limitations  - Instruct patient to call for assistance with activity based on assessment  - Modify environment to reduce risk of injury  - Consider OT/PT consult to assist with strengthening/mobility   Outcome: Progressing      Problem: Prexisting or High Potential for Compromised Skin Integrity  Goal: Skin integrity is maintained or improved  INTERVENTIONS:  - Identify patients at risk for skin breakdown  - Assess and monitor skin integrity  - Assess and monitor nutrition and hydration status  - Monitor labs (i e  albumin)  - Assess for incontinence   - Turn and reposition patient  - Assist with mobility/ambulation  - Relieve pressure over bony prominences  - Avoid friction and shearing  - Provide appropriate hygiene as needed including keeping skin clean and dry  - Evaluate need for skin moisturizer/barrier cream  - Collaborate with interdisciplinary team (i e  Nutrition, Rehabilitation, etc )   - Patient/family teaching   Outcome: Progressing      Problem: Nutrition/Hydration-ADULT  Goal: Nutrient/Hydration intake appropriate for improving, restoring or maintaining nutritional needs  Monitor and assess patient's nutrition/hydration status for malnutrition (ex- brittle hair, bruises, dry skin, pale skin and conjunctiva, muscle wasting, smooth red tongue, and disorientation)  Collaborate with interdisciplinary team and initiate plan and interventions as ordered  Monitor patient's weight and dietary intake as ordered or per policy  Utilize nutrition screening tool and intervene per policy   Determine patient's food preferences and provide high-protein, high-caloric foods as appropriate       INTERVENTIONS:  - Monitor oral intake, urinary output, labs, and treatment plans  - Assess nutrition and hydration status and recommend course of action  - Evaluate amount of meals eaten  - Assist patient with eating if necessary   - Allow adequate time for meals  - Recommend/ encourage appropriate diets, oral nutritional supplements, and vitamin/mineral supplements  - Order, calculate, and assess calorie counts as needed  - Recommend, monitor, and adjust tube feedings and TPN/PPN based on assessed needs  - Assess need for intravenous fluids  - Provide specific nutrition/hydration education as appropriate  - Include patient/family/caregiver in decisions related to nutrition   Outcome: Progressing      Problem: RESPIRATORY - ADULT  Goal: Achieves optimal ventilation and oxygenation  INTERVENTIONS:  - Assess for changes in respiratory status  - Assess for changes in mentation and behavior  - Position to facilitate oxygenation and minimize respiratory effort  - Oxygen administration by appropriate delivery method based on oxygen saturation (per order) or ABGs  - Initiate smoking cessation education as indicated  - Encourage broncho-pulmonary hygiene including cough, deep breathe, Incentive Spirometry  - Assess the need for suctioning and aspirate as needed  - Assess and instruct to report SOB or any respiratory difficulty  - Respiratory Therapy support as indicated   Outcome: Progressing      Problem: METABOLIC, FLUID AND ELECTROLYTES - ADULT  Goal: Electrolytes maintained within normal limits  INTERVENTIONS:  - Monitor labs and assess patient for signs and symptoms of electrolyte imbalances  - Administer electrolyte replacement as ordered  - Monitor response to electrolyte replacements, including repeat lab results as appropriate  - Instruct patient on fluid and nutrition as appropriate   Outcome: Progressing    Goal: Fluid balance maintained  INTERVENTIONS:  - Monitor labs and assess for signs and symptoms of volume excess or deficit  - Monitor I/O and WT  - Instruct patient on fluid and nutrition as appropriate   Outcome: Progressing      Problem: SKIN/TISSUE INTEGRITY - ADULT  Goal: Skin integrity remains intact  INTERVENTIONS  - Identify patients at risk for skin breakdown  - Assess and monitor skin integrity  - Assess and monitor nutrition and hydration status  - Monitor labs (i e  albumin)  - Assess for incontinence   - Turn and reposition patient  - Assist with mobility/ambulation  - Relieve pressure over bony prominences  - Avoid friction and shearing  - Provide appropriate hygiene as needed including keeping skin clean and dry  - Evaluate need for skin moisturizer/barrier cream  - Collaborate with interdisciplinary team (i e  Nutrition, Rehabilitation, etc )   - Patient/family teaching   Outcome: Progressing      Problem: MUSCULOSKELETAL - ADULT  Goal: Maintain or return mobility to safest level of function  INTERVENTIONS:  - Assess patient's ability to carry out ADLs; assess patient's baseline for ADL function and identify physical deficits which impact ability to perform ADLs (bathing, care of mouth/teeth, toileting, grooming, dressing, etc )  - Assess/evaluate cause of self-care deficits   - Assess range of motion  - Assess patient's mobility; develop plan if impaired  - Assess patient's need for assistive devices and provide as appropriate  - Encourage maximum independence but intervene and supervise when necessary  - Involve family in performance of ADLs  - Assess for home care needs following discharge   - Request OT consult to assist with ADL evaluation and planning for discharge  - Provide patient education as appropriate   Outcome: Progressing      Problem: DISCHARGE PLANNING - CARE MANAGEMENT  Goal: Discharge to post-acute care or home with appropriate resources  INTERVENTIONS:  - Conduct assessment to determine patient/family and health care team treatment goals, and need for post-acute services based on payer coverage, community resources, and patient preferences, and barriers to discharge  - Address psychosocial, clinical, and financial barriers to discharge as identified in assessment in conjunction with the patient/family and health care team  - Arrange appropriate level of post-acute services according to patients   needs and preference and payer coverage in collaboration with the physician and health care team  - Communicate with and update the patient/family, physician, and health care team regarding progress on the discharge plan  - Arrange appropriate transportation to post-acute venues   Outcome: Progressing

## 2018-08-10 NOTE — DISCHARGE SUMMARY
Discharge Summary - Medical My Ha  80 y o  male MRN: [id number]    [place] [id number] [place] [address] MED SURG Room / Bed: [place] [address] [address] 457/E4 -* Encounter: [id number]    BRIEF OVERVIEW      Admission Date: 8/2/2018       Discharge Date:  08/10/2018    Primary Diagnoses  Principal Problem:    Acute respiratory failure with hypoxia and hypercarbia (HCC)  Active Problems:    Sinus bradycardia    Essential hypertension    Pure hypercholesterolemia    Coronary artery disease involving native heart without angina pectoris    Non-ST elevation (NSTEMI) myocardial infarction (HCC)    Acute on chronic diastolic CHF (congestive heart failure) (HCC)    Troponin I above reference range    BPH (benign prostatic hyperplasia)    Ambulatory dysfunction    Alkalosis    Chronic respiratory acidosis  Resolved Problems:    Hyperkalemia      Service:  [facility], [facility]  Consulting Providers   Cardiology: Dr Jacobo Newsome  Neuropsychology: Dr Anner Carrel: Dr Arun Phelan  Pulmonology: DR Tianna Almanza    Procedures Performed   none    Hospital Studies:  8/7:  Right lower extremity ultrasound:  Negative for DVT   Patient refused left lower extremity ultrasound      8/7:  V/Q scan:  Low probability pulmonary embolus     8/7:  Lower extremity Dopplers:  Pending     8/2:  Chest x-ray:  Bibasilar atelectasis     8/2:  Blood cultures:  Negative x2     8/3 echocardiogram:  Left ventricular ejection fraction 75%   No regional wall motion abnormalities   Wall thickness mildly-moderately increased   Left atrium mildly dilated   Right atrium moderately dilated   Mild mitral regurgitation, mild tricuspid regurgitation      Results from last 7 days  Lab Units 08/10/18  0555 08/07/18  0549   WBC Thousand/uL 5 72 7 16   HEMOGLOBIN g/dL 12 0 12 2   HEMATOCRIT % 42 1 41 6   PLATELETS Thousands/uL 156 165       Results from last 7 days  Lab Units 08/10/18  0555 08/09/18  3823 08/08/18  0429   SODIUM mmol/L 144 144 142   POTASSIUM mmol/L 3 0* 3 6 3 6   CHLORIDE mmol/L 104 103 101   CO2 mmol/L 39* 42* 44*   BUN mg/dL 30* 34* 25   CREATININE mg/dL 0 85 0 89 0 64   GLUCOSE RANDOM mg/dL 103 90 105   CALCIUM mg/dL 9 0 9 3 8 7       History and Physical Exam:  Please refer to the Admission H&P note    Hospital Course by Problem  1-acute/chronic diastolic congestive heart failure exacerbation:   patient presented with evidence of volume overload and acute/chronic diastolic congestive heart failure exacerbation                 -was placed on IV Lasix, diuresed, and improved               -his weight has decreased 14 kg since admission, on IV Lasix              -Pro BNP improved from 19,000 to 5,000              -changed to p o  Lasix              -echocardiogram from 08/03 with left ventricular ejection fraction 75%   No regional wall motion abnormalities   Wall thickness mildly-moderately increased   Right ventricle mildly-moderately dilated    -patient be discharged on p o  Lasix     2-essential hypertension:  Blood pressure currently adequately controlled  Continue monitor on current regimen              -Continue to hold ACE-inhibitor due to prior hyperkalemia                  3-hyperlipidemia:  Continue statin     4-status post hyperkalemia:  ACE-inhibitor on hold  Anastasia Mckusick resolved      5-coronary artery disease:  With history of previous MRI   Patient presented with chest pain and a positive troponin   He was evaluated by the cardiology service   His positive troponin was felt to be most likely secondary to a non STEMI type 2, secondary to his congestive heart failure   Further ischemic testing was not recommended by Cardiology at this time               -continue medical therapy with aspirin, statin               -no beta-blocker due to bradycardia              -the patient had an episode of chest pain on 8/8 that spontaneously resolved prior to him receiving any nitroglycerin   EKG is currently in atrial fibrillation with Q-wave in lead V 3 and AVF with chart new, as well as T-wave inversions in lead 3, AVF, V1-V5    troponins were 0 04, 0 09, 0 12   Case had been reviewed with cardiology team  [facility] medical therapy, and conservative management      6-acute hypoxic/hypercapnic respiratory failure:  Patient presented to the ER in respiratory distress with hypoxia/hypercapnia   He initially was admitted to the step-down unit and required BiPAP              -continue to monitor and titrate off oxygen as tolerated              -will need home O2 eval prior to discharge               -V/Q scan low probability for pulmonary embolism              -pulmonary service notes the patient has chronic, severe, respiratory acidosis  Possibly due to restrictive lung disease or sleep apnea              -no history of COPD upon review of his old charts              -pt did not tolerate bipap l              -sats adequate on 1-2L nc    -patient had a home O2 eval, and will require oxygen, especially with any movement, at discharge to the rehab center                  7-PAF:  EKG revealed atrial fibrillation during the time of chest pain  Patient has a prior history of sinus bradycardia, it would not tolerate beta-blockers  Anticoagulation not recommended, due to fall risk               -continue aspirin      8-ambulatory dysfunction:  Patient was evaluated by Physical therapy who recommend inpatient short-term rehab   I again discussed this with patient today  Leyda Pham currently requires a 2 person assistance for transfers, and 24 hour supervision  Nancy Messina is not safe to be discharged to an independent living situation at this time  [facility] medical team recommends inpatient rehab               -patient refused to be discharged to an inpatient rehab center  He was noted however to have fluctuations in his level of alertness, lethargy, and orientation    He was evaluated by the neuropsychology service and not felt to have capacity to make his decisions      -In the interest of his safety, and to continue full medical care/medications/supervision and testing, with a goal of him improving to the point where he could return home independently, his medical power of  elected inpatient short-term rehab      9-BPH:  Without current symptoms   Continue to monitor     10-chronic anemia:  Patient is on iron as an outpatient     11-chronic, severe, respiratory acidosis:  Patient's ABG revealed a normal pH, and patient is compensating for chronic severe respiratory acidosis, with compensatory metabolic alkalosis   He was evaluated by the pulmonary service   He was started on Diamox with improvement   BiPAP was ordered at bedtime-however patient did not tolerate   Continue to monitor at rehab     12-disposition: d/w cardiology and pulmonary teams- pt medically ready to be dc from hospital to rehab center, and all of his medical team recommends inpt STR and 24h supervised medical care as he continues his recovery and strengthening  Pt adamantly refused STR and wishes to go home               -patient was evaluated by the neuro psychologist who noted patient did not have capacity to make his medical decisions  In collaboration with his family, patient was discharged to inpatient rehab               15- family:  updated granddaughter Gaye Leo at [employer] multiple times today      Case was also discussed with cardiologist, Dr Naresh hazel, pulmonologist Dr Aye Lake, patient's nurse and       VTE Pharmacologic Prophylaxis: Heparin  VTE Mechanical Prophylaxis: sequential compression device      Discharge Condition: Improved  Discharge Disposition:  Inpatient rehab    Discharge Note and Physical Exam:   Please see note from earlier today    Discharge Medications   Please see Medical Reconciliation Discharge Form    Discharge Follow Up Appointments:   Boogie Jackson MD: 1 week    Discharge  Statement   Total Time Spent today including physical exam, discussion with patient and family, consultants and discharge arrangements/care = 90 minutes      This note has been constructed using a voice recognition system

## 2018-08-10 NOTE — SOCIAL WORK
Message left for Abdulkadir Berger at [place] to question when they would get determination for [facility] can accept over the weekend if insurance auth  Weekend CM would need to call [phone]  Shyanne w/c Nitza Mcdaniel is set up for 1600, however there is a question if pt would need to have oxygen  Waiting for determination

## 2018-08-10 NOTE — SOCIAL WORK
MD is planning on discharging pt today to [address] can accept  Conchita Herrera from [facility] will call Valeria Fields with auth numbers  [facility] set up BLS by [employer] at [phone] at [phone]  The auth number for the transport is [id number]  Medical Necessity form completed  Copy put in bin and daisy  US aware of chart copy  MD, RN, [facility],  and Grand-dtr Geeta aware the [facility] approved [facility] Fields and the  time is 1830

## 2018-08-10 NOTE — TREATMENT PLAN
Pt does appear to be sharper today, and continues to make requests to D/C AMA, or to go home without rehab  Given his recent fluctuations in mental status, and the apparent disconnect between his inability to ambulate and his desire to be independent in his home, we concur with Dr Sukumar Kapadia that a formal Neuropsych eval may be helpful in understanding the limits, if any, of this fellow's competence  We will revisit on Monday      Leroy Arambula MD  Palliative and Supportive Care  Pager: [phone] p

## 2018-08-10 NOTE — PLAN OF CARE

## 2018-08-10 NOTE — RESPIRATORY THERAPY NOTE
Home Oxygen Qualifying Test       Patient name: Derian James        : 1/15/1920   Date of Test:  August 10, 2018  Diagnosis:      Home Oxygen Test:    **Medicare Guidelines require item(s) 1-5 on all ambulatory patients or 1 and 2 on on-ambulatory patients  1   Baseline SPO2 on Room Air at rest93 %  If = or < 88% on room air add O2 via NC and titrate patient  Patient must be ambulated with O2 and titrated to > 88% with exertion  2   SPO2 on Oxygen at rest 91 %  3   SPO2 during exercise on Room Air 80 %  4   SPO2 during exercise on Oxygen  91% at a liter flow of 3 lpm     5   Exercise performed:    [x] Walking     [] Stairs     [x] Duration   5 (min )     [x] [id number](ft )       [x]  Supplemental Home Oxygen is indicated  []  Client does not qualify for home oxygen       Poor desaturation signal and very limited ambulation (with walker) please see resp note,   Kelly Aguirre, RT

## 2018-08-10 NOTE — RESPIRATORY THERAPY NOTE
Home O2 evaluation performed    RA rest 91%    Ambulation with walker and nursing assist on RA:    91% to 80 %    Ambulation with  walker and nursing assist on 3 lpm nasal oxygen:    80% to 91%     Ambulation time and distance very limited  approximately 30 seconds and 10 feet  Pt desated further at rest in chair and was placed on 7 lpm with sats from  80s to 93%    Question results due to poor perfusion signal and limited walking distance and time   Reported to Dr Leticia Neri    Thank you     Kellee CLARK RT

## 2018-08-10 NOTE — PROGRESS NOTES
Progress Note - Rashad Rose  80 y o  male MRN: [id number]    Unit/Bed#: E4 -01 Encounter: [id number]      Assessment/Plan:  1-acute/chronic diastolic congestive heart failure exacerbation:   patient presented with evidence of volume overload and acute/chronic diastolic congestive heart failure exacerbation                 -he continues to improve              -his weight has decreased 14 kg since admission, on IV Lasix              -Pro BNP improved from 19,000 to 5,000              -changed to p o  Lasix              -monitor daily weights and creatinine              -echocardiogram from 08/03 with left ventricular ejection fraction 75%   No regional wall motion abnormalities   Wall thickness mildly-moderately increased   Right ventricle mildly-moderately dilated      2-essential hypertension:  Blood pressure currently adequately controlled  Continue monitor on current regimen              -Continue to hold ACE-inhibitor due to prior hyperkalemia                  3-hyperlipidemia:  Continue statin     4-status post hyperkalemia:  ACE-inhibitor on hold  Grisel Carlos resolved      5-coronary artery disease:  With history of previous MRI   Patient presented with chest pain and a positive troponin   He was evaluated by the cardiology service   His positive troponin was felt to be most likely secondary to a non STEMI type 2, secondary to his congestive heart failure   Further ischemic testing was not recommended by Cardiology at this time               -continue medical therapy with aspirin, statin               -no beta-blocker due to bradycardia              -the patient had an episode of chest pain on 8/8 that spontaneously resolved prior to him receiving any nitroglycerin  EKG is currently in atrial fibrillation with Q-wave in lead V 3 and AVF with chart new, as well as T-wave inversions in lead 3, AVF, V1-V5    troponins were 0 04, 0 09, 0 12    Case had been reviewed with cardiology team  [facility] medical therapy, and conservative management      6-acute hypoxic/hypercapnic respiratory failure:  Patient presented to the ER in respiratory distress with hypoxia/hypercapnia   He initially was admitted to the step-down unit and required BiPAP              -continue to monitor and titrate off oxygen as tolerated              -will need home O2 eval prior to discharge               -V/Q scan low probability for pulmonary embolism              -pulmonary service notes the patient has chronic, severe, respiratory acidosis  Possibly due to restrictive lung disease or sleep apnea              -no history of COPD upon review of his old charts              -pt did not tolerate bipap l              -sats adequate on 1-2L nc                  7-PAF:  EKG revealed atrial fibrillation during the time of chest pain  Patient has a prior history of sinus bradycardia, it would not tolerate beta-blockers  Anticoagulation not recommended, due to fall risk               -continue aspirin      8-ambulatory dysfunction:  Patient was evaluated by Physical therapy who recommend inpatient short-term rehab   I again discussed this with patient today  Patient currently requires a 2 person assistance for transfers, and 24 hour supervision  He is not safe to be discharged to an independent living situation at this time  His medical team recommends inpatient rehab    -patient is currently demanding to be discharged against medical advice to home  Conferred with patient's granddaughter, case management, as well as the palliative care service regarding this  -patient is currently awake and alert  He is oriented to place and situation, and president, although not oriented to date  He has had previous fluctuations in his alertness, attributed to hypercapnia  He is currently awake and alert, not lethargic  Communicating with logical sentences and goal directed speech    Will consult Psychiatry for competency evaluation      9-BPH:  Without current symptoms   Continue to monitor     10-chronic anemia:  Patient is on iron as an outpatient     11-chronic, severe, respiratory acidosis:  Patient's ABG revealed a normal pH, and patient is compensating for chronic severe respiratory acidosis, with compensatory metabolic alkalosis   He was evaluated by the pulmonary service   He was started on Diamox with improvement   BiPAP was ordered at bedtime-however patient did not tolerate   Continue to monitor    12-disposition: d/w cardiology and pulmonary teams- pt medically ready to be dc from hospital to rehab center, and all of his medical team recommends inpt STR and 24h supervised medical care as he continues his recovery and strengthening  Pt adamantly refused STR and wishes to go home    -will consult neuro-psych for competency eval:  D/w : If pt is competent and demands dc home rather than STR will arrange maximal home services/support as possible, coordinating with his [facility] team               13- family:  updated granddaughter Paloma Mary at [facility] regarding todays events, recommendations and neuropsych eval pending       VTE Pharmacologic Prophylaxis: Heparin  VTE Mechanical Prophylaxis: sequential compression device      Certification Statement: The patient will continue to require additional inpatient hospital stay due to need for coordination of a safe discharge plan, and psychiatric consultation     Status: inpatient      Discussed with patient's nurse and   Discussed with Dr Alexis De León    Total time spent today including his interview, exam, prolonged discussion with patient regarding the dangers of leaving against medical advice, and recommendations for inpatient short-term rehab, as well as discussion with his medical team, Dr Gustavo Degroot, patient's nurse and :  60 min    ============================================    Subjective:  Patient denies any complaints    He states that he knows his body, and knows that he is better and back to his health  Patient requests discharge home today  He denies any pain  He denies any difficulty breathing  He denies any cough  He denies any complaints at all  He is requesting to sign himself out against medical advice  Physical Exam:   Temp:  [96 5 °F (35 8 °C)-97 4 °F (36 3 °C)] 97 4 °F (36 3 °C)  HR:  [54-66] 66  Resp:  [16-18] 18  BP: (100-147)/(57-66) 122/66    Gen:  Pleasant, non-tachypnic, non-dyspnic  Cachectic  Conversant  Heart: regular rate and rhythm, S1S2 present, no murmur, rub or gallop  Lungs:  Decreased air movement bilaterally  Diminished breath sounds  Prolonged expiratory phase  However no wheezes, crackles, rhonchi  No accessory muscle use or respiratory distress  Abd: soft, non-tender, non-distended  NABS, no guarding, rebound or peritoneal signs  Extremities: no clubbing, cyanosis or edema  2+pedal pulses bilaterally  Neuro: awake, alert  Currently oriented to place, hospital, president: Thinks month is February, day 27th or 28th, and not able to state any year at all  Skin: warm and dry: no petechiae, purpura and rash      LABS:     Results from last 7 days  Lab Units 08/10/18  0555 08/07/18  0549   WBC Thousand/uL 5 72 7 16   HEMOGLOBIN g/dL 12 0 12 2   HEMATOCRIT % 42 1 41 6   PLATELETS Thousands/uL 156 165       Results from last 7 days  Lab Units 08/10/18  0555 08/09/18  0433 08/08/18  0429   SODIUM mmol/L 144 144 142   POTASSIUM mmol/L 3 0* 3 6 3 6   CHLORIDE mmol/L 104 103 101   CO2 mmol/L 39* 42* 44*   BUN mg/dL 30* 34* 25   CREATININE mg/dL 0 85 0 89 0 64   GLUCOSE RANDOM mg/dL 103 90 105   CALCIUM mg/dL 9 0 9 3 8 7       Hospital Data:  8/7:  Right lower extremity ultrasound:  Negative for DVT   Patient refused left lower extremity ultrasound      8/7:  V/Q scan:  Low probability pulmonary embolus     8/7:  Lower extremity Dopplers:  Pending     8/2:  Chest x-ray:  Bibasilar atelectasis     8/2:  Blood cultures: [facility] x2     8/3 echocardiogram:  Left ventricular ejection fraction 75%   No regional wall motion abnormalities   Wall thickness mildly-moderately increased   Left atrium mildly dilated   Right atrium moderately dilated   Mild mitral regurgitation, mild tricuspid regurgitation           ---------------------------------------------------------------------------------------------------------------  This note has been constructed using a voice recognition system

## 2018-08-10 NOTE — CASE MANAGEMENT
Continued Stay Review    Date:  8/10/2018    Vital Signs: /66 (BP Location: Right arm)   Pulse 66   Temp (!) 97 4 °F (36 3 °C) (Tympanic)   Resp 18   Ht 5' 5" (1 651 m)   Wt 48 5 kg (106 lb 14 8 oz)   SpO2 99%   BMI 17 79 kg/m²     Medications:   Scheduled Meds:   Current Facility-Administered Medications:         aspirin 81 mg Oral Daily    atorvastatin 40 mg Oral Daily With Dinner           chlorhexidine 15 mL Swish & Spit Q12H [facility]           ferrous sulfate 325 mg Oral BID    finasteride 5 mg Oral Daily    fluticasone 1 spray Nasal BID    furosemide 40 mg Oral Daily    heparin (porcine) 5,000 Units Subcutaneous Q8H [facility]    latanoprost 1 drop Left Eye Daily           pantoprazole 20 mg Oral Early Morning    timolol 1 drop Left Eye Daily      Continuous Infusions:    PRN Meds:   acetaminophen    bisacodyl    docusate sodium po x 1    nitroglycerin    Abnormal Labs/Diagnostic Results:  K 3 0,   CO 2  39,   BUN 30    Age/Sex: 80 y o  male       Assessment/Plan:   1-acute/chronic diastolic congestive heart failure exacerbation:   patient presented with evidence of volume overload and acute/chronic diastolic congestive heart failure exacerbation                 -he continues to improve              -his weight has decreased 14 kg since admission, on IV Lasix              -Pro BNP improved from 19,000 to 5,000              -changed to p o  Lasix              -monitor daily weights and creatinine              -echocardiogram from 08/03 with left ventricular ejection fraction 75%   No regional wall motion abnormalities   Wall thickness mildly-moderately increased   Right ventricle mildly-moderately dilated      2-essential hypertension:  Blood pressure currently adequately controlled  Continue monitor on current regimen              -Continue to hold ACE-inhibitor due to prior hyperkalemia                  3-hyperlipidemia:  Continue statin     4-status post hyperkalemia:  ACE-inhibitor on hold   Hyperkalemia resolved      5-coronary artery disease:  With history of previous MRI   Patient presented with chest pain and a positive troponin   He was evaluated by the cardiology service   His positive troponin was felt to be most likely secondary to a non STEMI type 2, secondary to his congestive heart failure   Further ischemic testing was not recommended by Cardiology at this time               -continue medical therapy with aspirin, statin               -no beta-blocker due to bradycardia              -the patient had an episode of chest pain on 8/8 that spontaneously resolved prior to him receiving any nitroglycerin  EKG is currently in atrial fibrillation with Q-wave in lead V 3 and AVF with chart new, as well as T-wave inversions in lead 3, AVF, V1-V5    troponins were 0 04, 0 09, 0 12   Case had been reviewed with cardiology team  [facility] medical therapy, and conservative management      6-acute hypoxic/hypercapnic respiratory failure:  Patient presented to the ER in respiratory distress with hypoxia/hypercapnia   He initially was admitted to the step-down unit and required BiPAP              -continue to monitor and titrate off oxygen as tolerated              -will need home O2 eval prior to discharge               -V/Q scan low probability for pulmonary embolism              -pulmonary service notes the patient has chronic, severe, respiratory acidosis  Possibly due to restrictive lung disease or sleep apnea              -no history of COPD upon review of his old charts              -pt did not tolerate bipap l              -sats adequate on 1-2L nc                  7-PAF:  EKG revealed atrial fibrillation during the time of chest pain  Patient has a prior history of sinus bradycardia, it would not tolerate beta-blockers    Anticoagulation not recommended, due to fall risk               -continue aspirin      8-ambulatory dysfunction:  Patient was evaluated by Physical therapy who recommend inpatient short-term rehab   I again discussed this with patient today  Mireya Armendariz currently requires a 2 person assistance for transfers, and 24 hour supervision  Lucila Vo is not safe to be discharged to an independent living situation at this time  [facility] medical team recommends inpatient rehab               -patient is currently demanding to be discharged against medical advice to home  Conferred with patient's granddaughter, case management, as well as the palliative care service regarding this  -patient is currently awake and alert  He is oriented to place and situation, and president, although not oriented to date  He has had previous fluctuations in his alertness, attributed to hypercapnia  He is currently awake and alert, not lethargic  Communicating with logical sentences and goal directed speech  Will consult Psychiatry for competency evaluation      9-BPH:  Without current symptoms   Continue to monitor     10-chronic anemia:  Patient is on iron as an outpatient     11-chronic, severe, respiratory acidosis:  Patient's ABG revealed a normal pH, and patient is compensating for chronic severe respiratory acidosis, with compensatory metabolic alkalosis   He was evaluated by the pulmonary service   He was started on Diamox with improvement   BiPAP was ordered at bedtime-however patient did not tolerate   Continue to monitor     12-disposition: d/w cardiology and pulmonary teams- pt medically ready to be dc from hospital to rehab center, and all of his medical team recommends inpt STR and 24h supervised medical care as he continues his recovery and strengthening  Pt adamantly refused STR and wishes to go home               -will consult neuro-psych for competency eval:  D/w :   If pt is competent and demands dc home rather than STR will arrange maximal home services/support as possible, coordinating with [facility] team               13- family:  updated granddaughter Tc Sibley at length regarding todays events, recommendations and neuropsych eval pending       Certification Statement: The patient will continue to require additional inpatient hospital stay due to need for coordination of a safe discharge plan, and psychiatric consultation    Neuro: awake, alert  Currently oriented to place, hospital, president: Thinks month is February, day 27th or 28th, and not able to state any year at all  Discharge Plan:  Pending above  Anticipate Merritt Weir    Thank you,  [employer]  [address] Utilization Review Department  Phone: [phone]; Fax [phone]  ATTENTION: The Network Utilization Review Department is now centralized for our 9 Facilities  Make a note that we have a new phone and fax numbers for our Department  Please call with any questions or concerns to [phone] and carefully follow the prompts so that you are directed to the right person  All voicemails are confidential  Fax any determinations, approvals, denials, and requests for initial or continue stay review clinical to [phone]  Due to HIGH CALL volume, it would be easier if you could please send faxed requests to expedite your requests and in part, help us provide discharge notifications faster

## 2018-08-10 NOTE — SOCIAL WORK
Cici Ponce w/c Rubi horne  Have not heard back from the [place] with auth  RN reports pt will now need to be on 02, so pt will need BLS transport  Need to find out if the [place] will cover this  Update given to MD,  RN and grand-dtr Mayra Mccrary

## 2018-08-10 NOTE — PROGRESS NOTES
Progress Note - Pulmonary   Brielle Vazquez  80 y o  male MRN: [id number]  Unit/Bed#: E4 -01 Encounter: [id number]      Assessment/Plan:  1  Acute on chronic hypoxic & hypercapnic respiratory failure        *  With compensated chronic severe respiratory acidosis --> serum bicarb now down to 39        *  Diamox complete (has had 12 doses)        *  Does not tolerate BiPAP        *  Titrate supplemental oxygen as able to keep saturations greater than or equal to 88%        *  Will need to clarify oxygen needs prior to D/C        *  Incentive spirometry Q1hr, OOB as able, increase activity as able  2  Acute on chronic diastolic CHF        *  PO lasix    ~Stable from a pulmonary standpoint  Will sign off, please call with questions  ~No need for outpatient pulmonary follow up at this time    Subjective:   Mr Mariusz Sapp is seen laying in bed  He is sleeping, but does arouse  Diamox is complete  He is comfortable on 2 L nasal cannula  Unfortunately, he cannot tolerate BiPAP  He denies any chest pain, resting shortness of breath, cough, sputum production, fever or bronchospasm  Objective:     Vitals: Blood pressure 122/66, pulse 66, temperature (!) 97 4 °F (36 3 °C), temperature source Tympanic, resp  rate 18, height 5' 5" (1 651 m), weight 48 5 kg (106 lb 14 8 oz), SpO2 95 %  , 2LNC, Body mass index is 17 79 kg/m²  Intake/Output Summary (Last 24 hours) at 08/10/18 1107  Last data filed at 08/10/18 0731   Gross per 24 hour   Intake                0 ml   Output              880 ml   Net             -880 ml         Physical Exam  Gen: Awake, alert, oriented x 3, no acute distress  HEENT: Mucous membranes moist, no oral lesions, no thrush, wearing O2 via nasal cannula  NECK: No accessory muscle use, JVP not elevated  Cardiac: Regular, single S1, single S2, no murmurs, no rubs, no gallops  Lungs:  Poor effort with decreased breath sounds throughout bilaterally    Otherwise, clear to auscultation without wheezes, rhonchi or rales  Abdomen: normoactive bowel sounds, soft nontender, nondistended, no rebound or rigidity, no guarding  Extremities: no cyanosis, no clubbing, no edema, wearing SCDs bilaterally    Labs: I have personally reviewed pertinent lab results  , ABG: No results found for: PHART, WAV4BOU, PO2ART, LUE8HYF, X9CPHWRR, BEART, SOURCE, BNP: No results found for: BNP, CBC:   Lab Results   Component Value Date    WBC 5 72 08/10/2018    HGB 12 0 08/10/2018    HCT 42 1 08/10/2018     (H) 08/10/2018     08/10/2018    MCH 29 4 08/10/2018    MCHC 28 5 (L) 08/10/2018    RDW 15 2 (H) 08/10/2018    MPV 10 4 08/10/2018   , CMP:   Lab Results   Component Value Date     08/10/2018    K 3 0 (L) 08/10/2018     08/10/2018    CO2 39 (H) 08/10/2018    ANIONGAP 1 (L) 08/10/2018    BUN 30 (H) 08/10/2018    CREATININE 0 85 08/10/2018    GLUCOSE 103 08/10/2018    CALCIUM 9 0 08/10/2018    EGFR 73 08/10/2018   , PT/INR: No results found for: PT, INR, Troponin: No results found for: TROPONINI     Imaging and other studies: I have personally reviewed pertinent films in PACS    No new pulmonary imaging since August 7, 2018    [place], [place]

## 2018-08-23 ENCOUNTER — PATIENT OUTREACH (OUTPATIENT)
Dept: CARDIOLOGY CLINIC | Facility: CLINIC | Age: 83
End: 2018-08-23

## 2018-12-05 ENCOUNTER — OFFICE VISIT (OUTPATIENT)
Dept: CARDIOLOGY CLINIC | Facility: CLINIC | Age: 83
End: 2018-12-05
Payer: MEDICARE

## 2018-12-05 VITALS
BODY MASS INDEX: 19.16 KG/M2 | WEIGHT: 115 LBS | HEART RATE: 72 BPM | HEIGHT: 65 IN | DIASTOLIC BLOOD PRESSURE: 68 MMHG | SYSTOLIC BLOOD PRESSURE: 126 MMHG

## 2018-12-05 DIAGNOSIS — I25.10 CORONARY ARTERY DISEASE INVOLVING NATIVE CORONARY ARTERY OF NATIVE HEART WITHOUT ANGINA PECTORIS: ICD-10-CM

## 2018-12-05 DIAGNOSIS — I50.32 CHRONIC DIASTOLIC HEART FAILURE (HCC): Primary | ICD-10-CM

## 2018-12-05 DIAGNOSIS — E78.00 PURE HYPERCHOLESTEROLEMIA: ICD-10-CM

## 2018-12-05 DIAGNOSIS — I10 ESSENTIAL HYPERTENSION: ICD-10-CM

## 2018-12-05 PROCEDURE — 99214 OFFICE O/P EST MOD 30 MIN: CPT | Performed by: INTERNAL MEDICINE

## 2018-12-05 NOTE — PROGRESS NOTES
Cardiology Follow Up    Fide Bashir   1/15/1920  [id number]  [address] [facility] [address] [place] [address]  [phone]    1  Chronic diastolic heart failure ([facility] [address] )     2  Essential hypertension     3  Coronary artery disease involving native coronary artery of native heart without angina pectoris     4  Pure hypercholesterolemia         Interval History:  No complaints today  Denies chest pain shortness of breath orthopnea paroxysmal nocturnal    Patient Active Problem List   Diagnosis    Sinus bradycardia    Essential hypertension    Pure hypercholesterolemia    Coronary artery disease involving native heart without angina pectoris    Non-ST elevation (NSTEMI) myocardial infarction ([facility] [address] )    Acute respiratory failure with hypoxia (HCC)    Acute respiratory failure with hypoxia and hypercarbia ([facility])    Acute on chronic diastolic CHF (congestive heart failure) ([facility])    Troponin I above reference range    BPH (benign prostatic hyperplasia)    Ambulatory dysfunction    Alkalosis    Chronic respiratory acidosis     Past Medical History:   Diagnosis Date    Anemia     Cardiac disease     GERD (gastroesophageal reflux disease)     History of BPH      Social History     Social History    Marital status: Single     Spouse name: N/A    Number of children: N/A    Years of education: N/A     Occupational History    Not on file  Social History Main Topics    Smoking status: Never Smoker    Smokeless tobacco: Never Used    Alcohol use No    Drug use: No    Sexual activity: Not on file     Other Topics Concern    Not on file     Social History Narrative    No narrative on file      No family history on file  No past surgical history on file      Current Outpatient Prescriptions:     acetaminophen (TYLENOL) 325 mg tablet, Take 650 mg by mouth every 6 (six) hours as needed for mild pain, Disp: , Rfl:     aspirin 325 mg tablet, Take 81 mg by mouth daily  , Disp: , Rfl:     docusate sodium (COLACE) 100 mg capsule, Take 100 mg by mouth daily as needed for constipation, Disp: , Rfl:     finasteride (PROSCAR) 5 mg tablet, Take 5 mg by mouth daily, Disp: , Rfl:     fluticasone (FLONASE) 50 mcg/act nasal spray, 1 spray into each nostril 2 (two) times a day, Disp: , Rfl:     furosemide (LASIX) 40 mg tablet, Take 1 tablet (40 mg total) by mouth daily, Disp: 30 tablet, Rfl: 0    IRON PO, Take 324 mg by mouth 2 (two) times a day  , Disp: , Rfl:     latanoprost (XALATAN) 0 005 % ophthalmic solution, Administer 1 drop into the left eye daily  , Disp: , Rfl:     nitroglycerin (NITROSTAT) 0 4 mg SL tablet, Place 1 tablet (0 4 mg total) under the tongue every 5 (five) minutes as needed for chest pain, Disp: 90 tablet, Rfl: 0    Nutritional Supplements (ENSURE PLUS) LIQD, Take 1 Can by mouth 2 (two) times a day  , Disp: , Rfl:     omeprazole (PriLOSEC) 10 mg delayed release capsule, Take 20 mg by mouth daily, Disp: , Rfl:     potassium chloride (K-DUR,KLOR-CON) 20 mEq tablet, Take 20 mEq by mouth, Disp: , Rfl:     simvastatin (ZOCOR) 80 mg tablet, Take 40 mg by mouth daily at bedtime, Disp: , Rfl:     timolol (TIMOPTIC) 0 25 % ophthalmic solution, Administer 1 drop into the left eye daily  , Disp: , Rfl:   No Known Allergies    Labs:  No visits with results within 2 Month(s) from this visit  Latest known visit with results is:   Admission on 08/02/2018, Discharged on 08/10/2018   No results displayed because visit has over 200 results  Imaging: No results found  Review of Systems:  Review of Systems   Constitutional: Positive for fatigue  HENT: Positive for hearing loss  Negative for nosebleeds  Eyes: Negative for redness  Respiratory: Negative for chest tightness and shortness of breath  Cardiovascular: Negative for chest pain, palpitations and leg swelling     Gastrointestinal: Negative for abdominal pain  Endocrine: Negative for polyuria  Genitourinary: Negative for urgency  Musculoskeletal: Positive for arthralgias  Skin: Negative for rash  Neurological: Negative for dizziness and syncope  Psychiatric/Behavioral: Negative for confusion and sleep disturbance  The patient is not nervous/anxious  Physical Exam:  Physical Exam   Constitutional: He is oriented to person, place, and time  He appears well-developed and well-nourished  HENT:   Head: Normocephalic and atraumatic  Nose: Nose normal    Mouth/Throat: Oropharynx is clear and moist    Eyes: Pupils are equal, round, and reactive to light  Neck: Neck supple  Cardiovascular: Normal rate, regular rhythm and normal heart sounds  Pulmonary/Chest: Effort normal and breath sounds normal    Abdominal: Soft  Bowel sounds are normal    Musculoskeletal: Normal range of motion  Neurological: He is alert and oriented to person, place, and time  Skin: Skin is warm and dry  Psychiatric: He has a normal mood and affect  His behavior is normal  Judgment and thought content normal        Discussion/Summary:  Hospitalized in August with diastolic heart failure  No issues since then  Is maintained on Lasix 40 mg daily  Blood pressure controlled on current medical regiment  He is on simvastatin  No angina  I will see him again in 3 months

## 2019-01-18 ENCOUNTER — HOSPITAL ENCOUNTER (EMERGENCY)
Facility: HOSPITAL | Age: 84
Discharge: HOME | End: 2019-01-18
Attending: EMERGENCY MEDICINE
Payer: OTHER GOVERNMENT

## 2019-01-18 ENCOUNTER — APPOINTMENT (EMERGENCY)
Dept: CT IMAGING | Facility: HOSPITAL | Age: 84
End: 2019-01-18
Payer: OTHER GOVERNMENT

## 2019-01-18 VITALS
HEART RATE: 58 BPM | SYSTOLIC BLOOD PRESSURE: 191 MMHG | TEMPERATURE: 97.3 F | DIASTOLIC BLOOD PRESSURE: 84 MMHG | OXYGEN SATURATION: 97 % | RESPIRATION RATE: 18 BRPM

## 2019-01-18 DIAGNOSIS — S61.412A SKIN TEAR OF LEFT HAND WITHOUT COMPLICATION, INITIAL ENCOUNTER: ICD-10-CM

## 2019-01-18 DIAGNOSIS — S01.01XA LACERATION OF SCALP, INITIAL ENCOUNTER: ICD-10-CM

## 2019-01-18 DIAGNOSIS — S09.90XA CLOSED HEAD INJURY, INITIAL ENCOUNTER: Primary | ICD-10-CM

## 2019-01-18 PROCEDURE — 99283 EMERGENCY DEPT VISIT LOW MDM: CPT

## 2019-01-18 PROCEDURE — 70450 CT HEAD/BRAIN W/O DYE: CPT

## 2019-01-18 PROCEDURE — 72125 CT NECK SPINE W/O DYE: CPT

## 2019-01-18 RX ORDER — GINSENG 100 MG
1 CAPSULE ORAL ONCE
Status: COMPLETED | OUTPATIENT
Start: 2019-01-18 | End: 2019-01-18

## 2019-01-18 RX ADMIN — Medication 2 APPLICATION: at 21:26

## 2019-01-18 RX ADMIN — BACITRACIN 1 SMALL APPLICATION: 500 OINTMENT TOPICAL at 21:26

## 2019-01-19 NOTE — ED PROVIDER NOTES
History  Chief Complaint   Patient presents with    Fall     patient bent over, lost balance, and fell into wall putting hole in wall  patient with appox 8 cm lac on back of head and bleeding on left hand  Patient denies loc  alert and o x 4  Wears O2  baby aspirin daily     Patient states that he lost his balance tonight  He was bending over to  a hot dog that fell on the floor when he states he lost his balance and fell backwards into a wall  Patient is complaining of a headache, laceration to the scalp and small skin tear to the left hand  Bleeding is controlled this time  He states that he did not lose consciousness but did have a little bit of visual disturbance which is now gone  History provided by:  Patient and relative   used: No    Fall   Mechanism of injury: fall    Injury location:  Head/neck and hand  Head/neck injury location:  Scalp  Hand injury location:  L hand  Incident location:  Home  Arrived directly from scene: yes    Fall:     Fall occurred: bending over  Impact surface:  Wall    Point of impact:  Head    Entrapped after fall: no    Protective equipment: none    Suspicion of alcohol use: no    Suspicion of drug use: no    Tetanus status:  Up to date  Prior to arrival data:     Bystander interventions:  First aid    Patient ambulatory at scene: yes      Blood loss: Moderate    Responsiveness at scene:  Alert    Orientation at scene:  Person, place, situation and time    Loss of consciousness: no      Amnesic to event: no      Airway interventions:  None    Breathing interventions:  None    Medications administered:  None    Immobilization:  None  Associated symptoms: headaches    Associated symptoms: no abdominal pain, no back pain, no chest pain, no nausea, no neck pain and no vomiting    Risk factors: no anticoagulation therapy        Prior to Admission Medications   Prescriptions Last Dose Informant Patient Reported? Taking?    IRON PO  Self Yes No Sig: Take 324 mg by mouth 2 (two) times a day     Nutritional Supplements (ENSURE PLUS) LIQD  Self Yes No   Sig: Take 1 Can by mouth 2 (two) times a day     acetaminophen (TYLENOL) 325 mg tablet  Self Yes No   Sig: Take 650 mg by mouth every 6 (six) hours as needed for mild pain   aspirin 325 mg tablet  Self Yes No   Sig: Take 81 mg by mouth daily     docusate sodium (COLACE) 100 mg capsule  Self Yes No   Sig: Take 100 mg by mouth daily as needed for constipation   finasteride (PROSCAR) 5 mg tablet  Self Yes No   Sig: Take 5 mg by mouth daily   fluticasone (FLONASE) 50 mcg/act nasal spray  Self Yes No   Si spray into each nostril 2 (two) times a day   furosemide (LASIX) 40 mg tablet  Self No No   Sig: Take 1 tablet (40 mg total) by mouth daily   latanoprost (XALATAN) 0 005 % ophthalmic solution  Self Yes No   Sig: Administer 1 drop into the left eye daily     nitroglycerin (NITROSTAT) 0 4 mg SL tablet  Self No No   Sig: Place 1 tablet (0 4 mg total) under the tongue every 5 (five) minutes as needed for chest pain   omeprazole (PriLOSEC) 10 mg delayed release capsule  Self Yes No   Sig: Take 20 mg by mouth daily   potassium chloride (K-DUR,KLOR-CON) 20 mEq tablet  Self Yes No   Sig: Take 20 mEq by mouth   simvastatin (ZOCOR) 80 mg tablet  Self Yes No   Sig: Take 40 mg by mouth daily at bedtime   timolol (TIMOPTIC) 0 25 % ophthalmic solution  Self Yes No   Sig: Administer 1 drop into the left eye daily        Facility-Administered Medications: None       Past Medical History:   Diagnosis Date    Anemia     Cardiac disease     GERD (gastroesophageal reflux disease)     History of BPH        History reviewed  No pertinent surgical history  History reviewed  No pertinent family history  I have reviewed and agree with the history as documented      Social History   Substance Use Topics    Smoking status: Never Smoker    Smokeless tobacco: Never Used    Alcohol use Yes      Comment: occasional        Review of Systems   HENT: Negative for nosebleeds  Eyes: Negative for pain and redness  Respiratory: Negative for chest tightness and shortness of breath  Cardiovascular: Negative for chest pain  Gastrointestinal: Negative for abdominal pain, nausea and vomiting  Musculoskeletal: Negative for back pain, gait problem, joint swelling, neck pain and neck stiffness  Skin: Positive for wound  Negative for color change  Allergic/Immunologic: Negative for immunocompromised state  Neurological: Positive for headaches  Negative for syncope, weakness and numbness  All other systems reviewed and are negative  Physical Exam  Physical Exam   Constitutional: He is oriented to person, place, and time  He appears well-developed and well-nourished  No distress  HENT:   Head: Normocephalic  Head is with laceration  Head is without raccoon's eyes, without Cobb's sign and without contusion  Right Ear: External ear normal    Left Ear: External ear normal    Nose: Nose normal  No nasal septal hematoma  Mouth/Throat: Mucous membranes are not pale and not cyanotic  No trismus in the jaw  Eyes: Pupils are equal, round, and reactive to light  Conjunctivae, EOM and lids are normal    Neck: Normal range of motion and full passive range of motion without pain  Neck supple  No spinous process tenderness and no muscular tenderness present  No neck rigidity  No tracheal deviation, no edema, no erythema and normal range of motion present  Cardiovascular: Normal rate, regular rhythm, normal heart sounds and intact distal pulses  Exam reveals no friction rub  No murmur heard  Pulmonary/Chest: Effort normal and breath sounds normal  No stridor  No respiratory distress  He has no wheezes  He has no rales  Abdominal: Soft  He exhibits no distension  There is no tenderness  There is no rebound and no guarding  Musculoskeletal: Normal range of motion   He exhibits edema (Mild, 1+ bilateral pitting edema to the ankles)  He exhibits no tenderness or deformity  Right shoulder: Normal         Left shoulder: Normal         Right elbow: Normal        Left elbow: Normal         Right wrist: Normal         Left wrist: Normal         Right hip: Normal         Left hip: Normal         Right knee: Normal         Left knee: Normal         Right ankle: Normal         Left ankle: Normal         Cervical back: Normal         Thoracic back: Normal         Lumbar back: Normal    Neurological: He is alert and oriented to person, place, and time  He has normal strength  No cranial nerve deficit or sensory deficit  GCS eye subscore is 4  GCS verbal subscore is 5  GCS motor subscore is 6  Skin: Skin is warm and dry  Laceration noted  He is not diaphoretic  Patchy cyanotic appearance to the feet  Family and patient state that this is chronic  Unchanged  Psychiatric: He has a normal mood and affect  Nursing note and vitals reviewed  Vital Signs  ED Triage Vitals [01/18/19 2036]   Temperature Pulse Respirations Blood Pressure SpO2   (!) 97 3 °F (36 3 °C) 61 (!) 26 123/92 96 %      Temp Source Heart Rate Source Patient Position - Orthostatic VS BP Location FiO2 (%)   Oral Monitor Sitting Right arm --      Pain Score       7           Vitals:    01/18/19 2036 01/18/19 2202 01/18/19 2309   BP: 123/92 (!) 229/102 (!) 191/84   Pulse: 61 72 58   Patient Position - Orthostatic VS: Sitting Sitting Sitting       Visual Acuity      ED Medications  Medications   LET gel 2 application (2 application Topical Given 1/18/19 2126)   bacitracin topical ointment 1 small application (1 small application Topical Given 1/18/19 2126)       Diagnostic Studies  Results Reviewed     None                 CT head without contrast   Final Result by Nasreen Ortega MD (01/18 2237)      No acute intracranial abnormality                    Workstation performed: [id number]         CT cervical spine without contrast   Final Result by Gustabo Goltz, MD (01/18 2225)      No cervical spine fracture or traumatic malalignment  Workstation performed: [id number]                    Procedures  Lac Repair  Date/Time: 1/18/2019 11:06 PM  Performed by: Mk Patricio  Authorized by: kM Patricio   Consent: Verbal consent obtained  Risks and benefits: risks, benefits and alternatives were discussed  Consent given by: patient  Patient understanding: patient states understanding of the procedure being performed  Radiology Images displayed and confirmed  If images not available, report reviewed: imaging studies available  Patient identity confirmed: verbally with patient  Time out: Immediately prior to procedure a "time out" was called to verify the correct patient, procedure, equipment, support staff and site/side marked as required  Body area: head/neck  Location details: scalp  Laceration length: 5 cm  Foreign body present: Sheet rock  Tendon involvement: none  Nerve involvement: none  Vascular damage: no    Anesthesia:  Local Anesthetic: LET (lido,epi,tetracaine)    Sedation:  Patient sedated: no    Wound Dehiscence:  Superficial Wound Dehiscence: simple closure      Procedure Details:  Preparation: Patient was prepped and draped in the usual sterile fashion    Irrigation solution: saline  Irrigation method: syringe  Amount of cleaning: standard  Debridement: none  Degree of undermining: none  Skin closure: staples  Number of sutures: 6  Technique: simple  Approximation: close  Approximation difficulty: simple  Dressing: 4x4 sterile gauze and non-adhesive packing strip  Patient tolerance: Patient tolerated the procedure well with no immediate complications             Phone Contacts  ED Phone Contact    ED Course                               MDM  Number of Diagnoses or Management Options  Closed head injury, initial encounter: new and requires workup  Laceration of scalp, initial encounter: new and requires workup  Skin tear of left hand without complication, initial encounter: new and requires workup  Diagnosis management comments: Patient presents for a fall tonight  States he bent over to  a hot dog and lost his balance and fell backwards into a wall  Patient had a large laceration to his scalp in a small skin tear to his left hand  Patient had normal range of motion of his hand and elbow on that side  CT scans of his head and cervical spine are negative  Area was washed out and 6 staples were placed with good wound closure and bleeding control  A Steri-Strip was placed over his wound to his left hand  Advised follow-up in the next few days if there is signs and symptoms of infection  Removal of staples in about 7-10 days by PCP  Amount and/or Complexity of Data Reviewed  Tests in the radiology section of CPT®: ordered and reviewed  Review and summarize past medical records: yes  Independent visualization of images, tracings, or specimens: yes    Patient Progress  Patient progress: stable    CritCare Time    Disposition  Final diagnoses:   Closed head injury, initial encounter   Laceration of scalp, initial encounter   Skin tear of left hand without complication, initial encounter     Time reflects when diagnosis was documented in both MDM as applicable and the Disposition within this note     Time User Action Codes Description Comment    1/18/2019 10:25 PM Willie Pride Add [S09 90XA] Closed head injury, initial encounter     1/18/2019 10:26 PM Angelique Morales Add [S01 [id number]] Laceration of scalp, initial encounter     1/18/2019 10:26 PM Willie Pride Add [R10 851D] Skin tear of left hand without complication, initial encounter       ED Disposition     ED Disposition Condition Comment    Discharge  Adriano Burk  discharge to home/self care      Condition at discharge: Good        Follow-up Information     Follow up With Specialties Details Why Claudetta Joy, MD  Schedule an appointment as soon as possible for a visit in 3 days For wound re-check [facility] [address]      Lara Marcos MD  Schedule an appointment as soon as possible for a visit in 7 days For suture removal [facility]             Discharge Medication List as of 1/18/2019 11:05 PM      CONTINUE these medications which have NOT CHANGED    Details   acetaminophen (TYLENOL) 325 mg tablet Take 650 mg by mouth every 6 (six) hours as needed for mild pain, Historical Med      aspirin 325 mg tablet Take 81 mg by mouth daily  , Historical Med      docusate sodium (COLACE) 100 mg capsule Take 100 mg by mouth daily as needed for constipation, Historical Med      finasteride (PROSCAR) 5 mg tablet Take 5 mg by mouth daily, Historical Med      fluticasone (FLONASE) 50 mcg/act nasal spray 1 spray into each nostril 2 (two) times a day, Historical Med      furosemide (LASIX) 40 mg tablet Take 1 tablet (40 mg total) by mouth daily, Starting Sat 8/11/2018, Normal      IRON PO Take 324 mg by mouth 2 (two) times a day  , Historical Med      latanoprost (XALATAN) 0 005 % ophthalmic solution Administer 1 drop into the left eye daily  , Historical Med      nitroglycerin (NITROSTAT) 0 4 mg SL tablet Place 1 tablet (0 4 mg total) under the tongue every 5 (five) minutes as needed for chest pain, Starting Fri 8/10/2018, Normal      Nutritional Supplements (ENSURE PLUS) LIQD Take 1 Can by mouth 2 (two) times a day  , Historical Med      omeprazole (PriLOSEC) 10 mg delayed release capsule Take 20 mg by mouth daily, Historical Med      potassium chloride (K-DUR,KLOR-CON) 20 mEq tablet Take 20 mEq by mouth, Historical Med      simvastatin (ZOCOR) 80 mg tablet Take 40 mg by mouth daily at bedtime, Historical Med      timolol (TIMOPTIC) 0 25 % ophthalmic solution Administer 1 drop into the left eye daily  , Historical Med           No discharge procedures on file      ED Provider  Electronically Signed by           Susana Cyr ,   01/18/19 8412

## 2019-01-19 NOTE — DISCHARGE INSTRUCTIONS
Facial Laceration   WHAT YOU NEED TO KNOW:   A facial laceration is a tear or cut in the skin caused by blunt or shearing forces, or sharp objects  Facial lacerations may be closed within 24 hours of injury  DISCHARGE INSTRUCTIONS:   Return to the emergency department if:   · You have a fever and the wound is painful, warm, or swollen  The wound area may be red, or fluid may come out of it  · You have heavy bleeding or bleeding that does not stop after 10 minutes of holding firm, direct pressure over the wound  Contact your healthcare provider if:   · Your wound reopens or your tape comes off  · Your wound is very painful  · Your wound is not healing, or you think there is an object in the wound  · The skin around your wound stays numb  · You have questions or concerns about your condition or care  Medicines:   · Antibiotics  may be given to prevent an infection if your wound was deep and had to be cleaned out  · Take your medicine as directed  Contact your healthcare provider if you think your medicine is not helping or if you have side effects  Tell him of her if you are allergic to any medicine  Keep a list of the medicines, vitamins, and herbs you take  Include the amounts, and when and why you take them  Bring the list or the pill bottles to follow-up visits  Carry your medicine list with you in case of an emergency  Care for your wound:  Care for your wound as directed to prevent infection and help it heal  Wash your hands with soap and warm water before and after you care for your wound  You may need to keep the wound dry for the first 24 to 48 hours  When your healthcare provider says it is okay, wash around your wound with soap and water, or as directed  Gently pat the area dry  Do not use alcohol or hydrogen peroxide to clean your wound unless you are directed to  · Do not take aspirin or NSAIDs for 24 hours after being injured  Aspirin and NSAIDs can increase blood flow   Your laceration may continue to bleed  · Do not take hot showers, eat or drink hot foods and liquids for 48 hours after being injured  Also, do not use a heating pad near your laceration  The heat can cause swelling in and around your laceration  · If your wound was covered with a bandage,  leave your bandage on as long as directed  Bandages keep your wound clean and protected  They can also prevent swelling  Ask when and how to change your bandage  Be careful not to apply the bandage or tape too tightly  This could cut off blood flow and cause more injury  · If your wound was closed with stitches,  keep your wound clean  Your healthcare provider may recommend that you apply antibiotic ointment after you clean your wound  · If your wound was closed with wound tape or medical strips,  keep the area clean and dry  The strips will usually fall off on their own after several days  · If your wound was closed with tissue glue,  do not use any ointments or lotions on the area  You may shower, but do not swim or soak in a bathtub  Gently pat the area dry after you take a shower  Do not pick at or scrub the glue area  Decrease scarring: The skin in the area of your wound may turn a different color if it is exposed to direct sunlight  After your wound is healed, use sunscreen over the area when you are out in the sun  You should do this for at least 6 months to 1 year after your injury  Some wounds scar less if they are covered while they heal   Follow up with your healthcare provider as directed: You may need to follow up with your healthcare provider in 24 to 48 hours to have your wound checked for infection  You may need to return in 3 to 5 days if you have stitches that need to be removed  Write down your questions so you remember to ask them during your visits    © 2017 [employer] Information is for End User's use only and may not be sold, redistributed or otherwise used for commercial purposes  All illustrations and images included in CareNotes® are the copyrighted property of [employer]  or Kendell Shepard  The above information is an  only  It is not intended as medical advice for individual conditions or treatments  Talk to your doctor, nurse or pharmacist before following any medical regimen to see if it is safe and effective for you  Head Injury   WHAT YOU NEED TO KNOW:   A head injury is most often caused by a blow to the head  This may occur from a fall, bicycle injury, sports injury, being struck in the head, or a motor vehicle accident  DISCHARGE INSTRUCTIONS:   Call 911 or have someone else call for any of the following:   · You cannot be woken  · You have a seizure  · You stop responding to others or you faint  · You have blurry or double vision  · Your speech becomes slurred or confused  · You have arm or leg weakness, loss of feeling, or new problems with coordination  · Your pupils are larger than usual or one pupil is a different size than the other  · You have blood or clear fluid coming out of your ears or nose  Seek care immediately if:   · You have repeated or forceful vomiting  · You feel confused  · Your headache gets worse or becomes severe  · You or someone caring for you notices that you are harder to wake than usual   Contact your healthcare provider if:   · Your symptoms last longer than 6 weeks after the injury  · You have questions or concerns about your condition or care  Medicines:   · Acetaminophen  decreases pain  Acetaminophen is available without a doctor's order  Ask how much to take and how often to take it  Follow directions  Acetaminophen can cause liver damage if not taken correctly  · Take your medicine as directed  Contact your healthcare provider if you think your medicine is not helping or if you have side effects  Tell him or her if you are allergic to any medicine   Keep a list of the medicines, vitamins, and herbs you take  Include the amounts, and when and why you take them  Bring the list or the pill bottles to follow-up visits  Carry your medicine list with you in case of an emergency  Self-care:   · Rest  or do quiet activities for 24 to 48 hours  Limit your time watching TV, using the computer, or doing tasks that require a lot of thinking  Slowly return to your normal activities as directed  Do not play sports or do activities that may cause you to get hit in the head  Ask your healthcare provider when you can return to sports  · Apply ice  on your head for 15 to 20 minutes every hour or as directed  Use an ice pack, or put crushed ice in a plastic bag  Cover it with a towel before you apply it to your skin  Ice helps prevent tissue damage and decreases swelling and pain  · Have someone stay with you for 24 hours  or as directed  This person can monitor you for complications and call [phone]  When you are awake the person should ask you a few questions to see if you are thinking clearly  An example would be to ask your name or your address  Prevent another head injury:   · Wear a helmet that fits properly  Do this when you play sports, or ride a bike, scooter, or skateboard  Helmets help decrease your risk of a serious head injury  Talk to your healthcare provider about other ways you can protect yourself if you play sports  · Wear your seat belt every time you are in a car  This helps to decrease your risk for a head injury if you are in a car accident  Follow up with your healthcare provider as directed:  Write down your questions so you remember to ask them during your visits  © 2017 [address] [facility] Information is for End User's use only and may not be sold, redistributed or otherwise used for commercial purposes  All illustrations and images included in CareNotes® are the copyrighted property of [employer]  or Kendell Shepard    The above information is an  only  It is not intended as medical advice for individual conditions or treatments  Talk to your doctor, nurse or pharmacist before following any medical regimen to see if it is safe and effective for you  Laceration   WHAT YOU NEED TO KNOW:   A laceration is an injury to the skin and the soft tissue underneath it  Lacerations happen when you are cut or hit by something  They can happen anywhere on the body  DISCHARGE INSTRUCTIONS:   Seek care immediately if:   · You have heavy bleeding or bleeding that does not stop after 10 minutes of holding firm, direct pressure over the wound  · Your wound opens up  Contact your healthcare provider if:   · You have a fever or chills  · Your laceration is red, warm, or swollen  · You have red streaks on your skin coming from your wound  · You have white or yellow drainage from the wound that smells bad  · You have pain that gets worse, even after treatment  · You have questions or concerns about your condition or care  Medicines:   · Prescription pain medicine  may be given  Ask how to take this medicine safely  · Antibiotics  help treat or prevent a bacterial infection  · Take your medicine as directed  Contact your healthcare provider if you think your medicine is not helping or if you have side effects  Tell him or her if you are allergic to any medicine  Keep a list of the medicines, vitamins, and herbs you take  Include the amounts, and when and why you take them  Bring the list or the pill bottles to follow-up visits  Carry your medicine list with you in case of an emergency  Care for your wound as directed:   · Do not get your wound wet  until your healthcare provider says it is okay  Do not soak your wound in water  Do not go swimming until your healthcare provider says it is okay  Carefully wash the wound with soap and water  Gently pat the area dry or allow it to air dry       · Change your bandages when they get wet, dirty, or after washing  Apply new, clean bandages as directed  Do not apply elastic bandages or tape too tight  Do not put powders or lotions over your incision  · Apply antibiotic ointment as directed  Your healthcare provider may give you antibiotic ointment to put over your wound if you have stitches  If you have strips of tape over your incision, let them dry up and fall off on their own  If they do not fall off within 14 days, gently remove them  If you have glue over your wound, do not remove or pick at it  If your glue comes off, do not replace it with glue that you have at home  · Check your wound every day for signs of infection such as swelling, redness, or pus  Self-care:   · Apply ice  on your wound for 15 to 20 minutes every hour or as directed  Use an ice pack, or put crushed ice in a plastic bag  Cover it with a towel  Ice helps prevent tissue damage and decreases swelling and pain  · Use a splint as directed  A splint will decrease movement and stress on your wound  It may help it heal faster  A splint may be used for lacerations over joints or areas of your body that bend  Ask your healthcare provider how to apply and remove a splint  · Decrease scarring of your wound  by applying ointments as directed  Do not apply ointments until your healthcare provider says it is okay  You may need to wait until your wound is healed  Ask which ointment to buy and how often to use it  After your wound is healed, use sunscreen over the area when you are out in the sun  You should do this for at least 6 months to 1 year after your injury  Follow up with your healthcare provider as directed: You will need to return in 3 to 14 days to have stitches or staples removed  Write down your questions so you remember to ask them during your visits    © 2017 [address] [employer] Information is for End User's use only and may not be sold, redistributed or otherwise used for commercial purposes  All illustrations and images included in CareNotes® are the copyrighted property of [employer]  or Kendell Shepard  The above information is an  only  It is not intended as medical advice for individual conditions or treatments  Talk to your doctor, nurse or pharmacist before following any medical regimen to see if it is safe and effective for you  Staple Care   WHAT YOU NEED TO KNOW:   Staples are often used to close a wound  Your staples may be placed for 3 to 14 days, depending on the location of your wound  DISCHARGE INSTRUCTIONS:   Care for your wound:   · Clean:      ¨ You may be able to shower in 24 hours  Do not soak your wound under water  ¨ Gently wash your wound with soap and warm water daily  Lightly pat it dry  Do not cover your wound unless your healthcare provider tells you to  ¨ You may also need to clean your wound with a mixture of hydrogen peroxide and water  Ask how to do this  ¨ Do not apply ointment or cream to the wound unless your healthcare provider tells you to  · Elevate:      ¨ Rest any arm or leg that has a wound on pillows above the level of your heart  Do this as often as possible for 2 days  This will help decrease swelling and pain, and help you heal faster  · Minimize scarring:      ¨ Avoid sunshine on your wound to reduce scarring  Follow up with your healthcare provider as directed: You may need to return for a wound checkup 3 days after your staples are placed  Ask when you should return to get your staples removed  Staple removal:   · A medical staple remover  will be used to take out your staples  Your healthcare provider will slide the tool under each staple, squeeze the handle, and gently pull the staple out  · Medical tape  will be placed on your wound once your staples are removed  This will help keep your wound closed  The medical tape will fall off on its own after several days    Contact your healthcare provider if:   · You have redness, pain, swelling, and pus draining from your wound  · Your pain medicine does not relieve your pain  · You have a fever of 101°F (38 5°C) or higher  · You have an odor coming from your wound  · You have questions or concerns about your condition or care  Return to the emergency department if:   · Your wound reopens  · You have red streaks in your skin that spread out from your wound  · You have severe pain or vomiting  © 2017 [address] [facility] Information is for End User's use only and may not be sold, redistributed or otherwise used for commercial purposes  All illustrations and images included in CareNotes® are the copyrighted property of [employer]  or Kendell Shepard  The above information is an  only  It is not intended as medical advice for individual conditions or treatments  Talk to your doctor, nurse or pharmacist before following any medical regimen to see if it is safe and effective for you

## 2019-03-13 LAB — HBA1C MFR BLD HPLC: 6.5 %

## 2019-05-08 ENCOUNTER — OFFICE VISIT (OUTPATIENT)
Dept: CARDIOLOGY CLINIC | Facility: CLINIC | Age: 84
End: 2019-05-08
Payer: MEDICARE

## 2019-05-08 VITALS
HEART RATE: 59 BPM | SYSTOLIC BLOOD PRESSURE: 128 MMHG | BODY MASS INDEX: 19.3 KG/M2 | DIASTOLIC BLOOD PRESSURE: 76 MMHG | WEIGHT: 116 LBS

## 2019-05-08 DIAGNOSIS — I10 ESSENTIAL HYPERTENSION: ICD-10-CM

## 2019-05-08 DIAGNOSIS — E78.00 PURE HYPERCHOLESTEROLEMIA: ICD-10-CM

## 2019-05-08 DIAGNOSIS — I25.10 CORONARY ARTERY DISEASE INVOLVING NATIVE CORONARY ARTERY OF NATIVE HEART WITHOUT ANGINA PECTORIS: Primary | ICD-10-CM

## 2019-05-08 PROCEDURE — 99214 OFFICE O/P EST MOD 30 MIN: CPT | Performed by: INTERNAL MEDICINE
